# Patient Record
Sex: FEMALE | Race: WHITE | NOT HISPANIC OR LATINO | URBAN - METROPOLITAN AREA
[De-identification: names, ages, dates, MRNs, and addresses within clinical notes are randomized per-mention and may not be internally consistent; named-entity substitution may affect disease eponyms.]

---

## 2024-03-11 ENCOUNTER — APPOINTMENT (OUTPATIENT)
Dept: RADIOLOGY | Facility: HOSPITAL | Age: 59
End: 2024-03-11
Payer: MEDICAID

## 2024-03-11 ENCOUNTER — HOSPITAL ENCOUNTER (INPATIENT)
Facility: HOSPITAL | Age: 59
LOS: 16 days | Discharge: HOME | End: 2024-03-28
Attending: EMERGENCY MEDICINE | Admitting: SURGERY
Payer: MEDICAID

## 2024-03-11 ENCOUNTER — HOSPITAL ENCOUNTER (EMERGENCY)
Facility: HOSPITAL | Age: 59
Discharge: OTHER INSTITUTION | End: 2024-03-11
Attending: EMERGENCY MEDICINE
Payer: MEDICAID

## 2024-03-11 VITALS
RESPIRATION RATE: 20 BRPM | DIASTOLIC BLOOD PRESSURE: 82 MMHG | BODY MASS INDEX: 46.83 KG/M2 | HEART RATE: 101 BPM | TEMPERATURE: 98.2 F | WEIGHT: 281.09 LBS | OXYGEN SATURATION: 98 % | HEIGHT: 65 IN | SYSTOLIC BLOOD PRESSURE: 140 MMHG

## 2024-03-11 DIAGNOSIS — A48.0 GAS GANGRENE (MULTI): ICD-10-CM

## 2024-03-11 DIAGNOSIS — M79.89 NECROTIZING SOFT TISSUE INFECTION: Primary | ICD-10-CM

## 2024-03-11 DIAGNOSIS — N49.3 FOURNIER'S GANGRENE (MULTI): Primary | ICD-10-CM

## 2024-03-11 LAB
ALBUMIN SERPL-MCNC: 3.4 G/DL (ref 3.5–5)
ALP BLD-CCNC: 133 U/L (ref 35–125)
ALT SERPL-CCNC: 17 U/L (ref 5–40)
ANION GAP SERPL CALC-SCNC: 12 MMOL/L
AST SERPL-CCNC: 16 U/L (ref 5–40)
BASOPHILS # BLD AUTO: 0.04 X10*3/UL (ref 0–0.1)
BASOPHILS NFR BLD AUTO: 0.3 %
BILIRUB SERPL-MCNC: 0.4 MG/DL (ref 0.1–1.2)
BUN SERPL-MCNC: 13 MG/DL (ref 8–25)
CALCIUM SERPL-MCNC: 9.4 MG/DL (ref 8.5–10.4)
CHLORIDE SERPL-SCNC: 101 MMOL/L (ref 97–107)
CO2 SERPL-SCNC: 24 MMOL/L (ref 24–31)
CREAT SERPL-MCNC: 0.9 MG/DL (ref 0.4–1.6)
EGFRCR SERPLBLD CKD-EPI 2021: 74 ML/MIN/1.73M*2
EOSINOPHIL # BLD AUTO: 0.09 X10*3/UL (ref 0–0.7)
EOSINOPHIL NFR BLD AUTO: 0.7 %
ERYTHROCYTE [DISTWIDTH] IN BLOOD BY AUTOMATED COUNT: 14.2 % (ref 11.5–14.5)
GLUCOSE SERPL-MCNC: 117 MG/DL (ref 65–99)
HCT VFR BLD AUTO: 43.4 % (ref 36–46)
HGB BLD-MCNC: 14.6 G/DL (ref 12–16)
IMM GRANULOCYTES # BLD AUTO: 0.04 X10*3/UL (ref 0–0.7)
IMM GRANULOCYTES NFR BLD AUTO: 0.3 % (ref 0–0.9)
LACTATE BLDV-SCNC: 1.2 MMOL/L (ref 0.4–2)
LYMPHOCYTES # BLD AUTO: 2.02 X10*3/UL (ref 1.2–4.8)
LYMPHOCYTES NFR BLD AUTO: 16.4 %
MCH RBC QN AUTO: 31.9 PG (ref 26–34)
MCHC RBC AUTO-ENTMCNC: 33.6 G/DL (ref 32–36)
MCV RBC AUTO: 95 FL (ref 80–100)
MONOCYTES # BLD AUTO: 1.04 X10*3/UL (ref 0.1–1)
MONOCYTES NFR BLD AUTO: 8.5 %
NEUTROPHILS # BLD AUTO: 9.05 X10*3/UL (ref 1.2–7.7)
NEUTROPHILS NFR BLD AUTO: 73.8 %
NRBC BLD-RTO: 0 /100 WBCS (ref 0–0)
PLATELET # BLD AUTO: 303 X10*3/UL (ref 150–450)
POTASSIUM SERPL-SCNC: 4.7 MMOL/L (ref 3.4–5.1)
PROT SERPL-MCNC: 8 G/DL (ref 5.9–7.9)
RBC # BLD AUTO: 4.57 X10*6/UL (ref 4–5.2)
SODIUM SERPL-SCNC: 137 MMOL/L (ref 133–145)
WBC # BLD AUTO: 12.3 X10*3/UL (ref 4.4–11.3)

## 2024-03-11 PROCEDURE — 2500000004 HC RX 250 GENERAL PHARMACY W/ HCPCS (ALT 636 FOR OP/ED): Mod: JZ

## 2024-03-11 PROCEDURE — 83605 ASSAY OF LACTIC ACID: CPT | Performed by: PHYSICIAN ASSISTANT

## 2024-03-11 PROCEDURE — 99291 CRITICAL CARE FIRST HOUR: CPT | Performed by: EMERGENCY MEDICINE

## 2024-03-11 PROCEDURE — 96361 HYDRATE IV INFUSION ADD-ON: CPT

## 2024-03-11 PROCEDURE — 96365 THER/PROPH/DIAG IV INF INIT: CPT | Mod: 59

## 2024-03-11 PROCEDURE — 74177 CT ABD & PELVIS W/CONTRAST: CPT

## 2024-03-11 PROCEDURE — 2500000004 HC RX 250 GENERAL PHARMACY W/ HCPCS (ALT 636 FOR OP/ED): Performed by: PHYSICIAN ASSISTANT

## 2024-03-11 PROCEDURE — 36415 COLL VENOUS BLD VENIPUNCTURE: CPT | Performed by: PHYSICIAN ASSISTANT

## 2024-03-11 PROCEDURE — 2550000001 HC RX 255 CONTRASTS: Performed by: EMERGENCY MEDICINE

## 2024-03-11 PROCEDURE — 96375 TX/PRO/DX INJ NEW DRUG ADDON: CPT

## 2024-03-11 PROCEDURE — 96374 THER/PROPH/DIAG INJ IV PUSH: CPT

## 2024-03-11 PROCEDURE — 80053 COMPREHEN METABOLIC PANEL: CPT | Performed by: PHYSICIAN ASSISTANT

## 2024-03-11 PROCEDURE — 74177 CT ABD & PELVIS W/CONTRAST: CPT | Performed by: RADIOLOGY

## 2024-03-11 PROCEDURE — 99285 EMERGENCY DEPT VISIT HI MDM: CPT | Mod: 25

## 2024-03-11 PROCEDURE — 96376 TX/PRO/DX INJ SAME DRUG ADON: CPT

## 2024-03-11 PROCEDURE — 85025 COMPLETE CBC W/AUTO DIFF WBC: CPT | Performed by: PHYSICIAN ASSISTANT

## 2024-03-11 PROCEDURE — 87040 BLOOD CULTURE FOR BACTERIA: CPT | Mod: 59,TRILAB | Performed by: PHYSICIAN ASSISTANT

## 2024-03-11 RX ORDER — VANCOMYCIN 2 G/400ML
2000 INJECTION, SOLUTION INTRAVENOUS ONCE
Status: DISCONTINUED | OUTPATIENT
Start: 2024-03-11 | End: 2024-03-11 | Stop reason: HOSPADM

## 2024-03-11 RX ORDER — MORPHINE SULFATE 4 MG/ML
4 INJECTION, SOLUTION INTRAMUSCULAR; INTRAVENOUS ONCE
Status: COMPLETED | OUTPATIENT
Start: 2024-03-11 | End: 2024-03-11

## 2024-03-11 RX ORDER — ONDANSETRON HYDROCHLORIDE 2 MG/ML
4 INJECTION, SOLUTION INTRAVENOUS ONCE
Status: COMPLETED | OUTPATIENT
Start: 2024-03-11 | End: 2024-03-11

## 2024-03-11 RX ORDER — VANCOMYCIN HYDROCHLORIDE 1 G/20ML
INJECTION, POWDER, LYOPHILIZED, FOR SOLUTION INTRAVENOUS DAILY PRN
Status: DISCONTINUED | OUTPATIENT
Start: 2024-03-11 | End: 2024-03-11

## 2024-03-11 RX ORDER — HYDROMORPHONE HYDROCHLORIDE 1 MG/ML
1 INJECTION, SOLUTION INTRAMUSCULAR; INTRAVENOUS; SUBCUTANEOUS ONCE
Status: COMPLETED | OUTPATIENT
Start: 2024-03-12 | End: 2024-03-12

## 2024-03-11 RX ADMIN — MORPHINE SULFATE 4 MG: 4 INJECTION, SOLUTION INTRAMUSCULAR; INTRAVENOUS at 22:03

## 2024-03-11 RX ADMIN — CEFEPIME 1 G: 1 INJECTION, POWDER, FOR SOLUTION INTRAMUSCULAR; INTRAVENOUS at 20:15

## 2024-03-11 RX ADMIN — MORPHINE SULFATE 4 MG: 4 INJECTION, SOLUTION INTRAMUSCULAR; INTRAVENOUS at 20:25

## 2024-03-11 RX ADMIN — VANCOMYCIN 2000 MG: 2 INJECTION, SOLUTION INTRAVENOUS at 21:33

## 2024-03-11 RX ADMIN — IOHEXOL 75 ML: 350 INJECTION, SOLUTION INTRAVENOUS at 20:20

## 2024-03-11 RX ADMIN — ONDANSETRON 4 MG: 2 INJECTION INTRAMUSCULAR; INTRAVENOUS at 20:25

## 2024-03-11 RX ADMIN — SODIUM CHLORIDE 1000 ML: 9 INJECTION, SOLUTION INTRAVENOUS at 19:15

## 2024-03-11 ASSESSMENT — PAIN SCALES - GENERAL: PAINLEVEL_OUTOF10: 6

## 2024-03-11 ASSESSMENT — COLUMBIA-SUICIDE SEVERITY RATING SCALE - C-SSRS
1. IN THE PAST MONTH, HAVE YOU WISHED YOU WERE DEAD OR WISHED YOU COULD GO TO SLEEP AND NOT WAKE UP?: NO
6. HAVE YOU EVER DONE ANYTHING, STARTED TO DO ANYTHING, OR PREPARED TO DO ANYTHING TO END YOUR LIFE?: NO
2. HAVE YOU ACTUALLY HAD ANY THOUGHTS OF KILLING YOURSELF?: NO
1. IN THE PAST MONTH, HAVE YOU WISHED YOU WERE DEAD OR WISHED YOU COULD GO TO SLEEP AND NOT WAKE UP?: NO
2. HAVE YOU ACTUALLY HAD ANY THOUGHTS OF KILLING YOURSELF?: NO
6. HAVE YOU EVER DONE ANYTHING, STARTED TO DO ANYTHING, OR PREPARED TO DO ANYTHING TO END YOUR LIFE?: NO

## 2024-03-11 ASSESSMENT — PAIN DESCRIPTION - ONSET: ONSET: SUDDEN

## 2024-03-11 ASSESSMENT — PAIN - FUNCTIONAL ASSESSMENT
PAIN_FUNCTIONAL_ASSESSMENT: 0-10
PAIN_FUNCTIONAL_ASSESSMENT: 0-10

## 2024-03-11 ASSESSMENT — PAIN DESCRIPTION - PAIN TYPE: TYPE: ACUTE PAIN

## 2024-03-11 ASSESSMENT — PAIN DESCRIPTION - ORIENTATION: ORIENTATION: RIGHT

## 2024-03-11 ASSESSMENT — PAIN DESCRIPTION - PROGRESSION: CLINICAL_PROGRESSION: NOT CHANGED

## 2024-03-11 ASSESSMENT — PAIN DESCRIPTION - LOCATION: LOCATION: GROIN

## 2024-03-11 ASSESSMENT — PAIN DESCRIPTION - FREQUENCY: FREQUENCY: CONSTANT/CONTINUOUS

## 2024-03-11 ASSESSMENT — PAIN DESCRIPTION - DESCRIPTORS: DESCRIPTORS: BURNING;SHARP

## 2024-03-11 NOTE — LETTER
March 28, 2024     Patient: Susan Farfan   YOB: 1965   Date of Visit: 3/11/2024       To Whom It May Concern:    It is my medical opinion that Susan Farfan may return to work on 4/1/2024 .    If you have any questions or concerns, please don't hesitate to call.         Sincerely,        Jasmin Aguilar PA-C      CC: No Recipients

## 2024-03-11 NOTE — ED PROVIDER NOTES
HPI   Chief Complaint   Patient presents with   • Abscess     A week ago Thursday I had a boil and it popped and it got worse it feel stinging burning pain  it is odiferious       58-year-old female presented emergency department with a chief complaint of an infection in her groin.  States has been there for over a week.  He states that is draining and foul-smelling.  She saw her primary doctor was placed on a weeklong course of clindamycin without improvement.  She is a diabetic.  She denies fevers.  She is tachycardic on arrival.  She states that the wound has been continuously draining.  Denies injury or trauma.  Denies abdominal pain or discomfort.  Denies vomiting.                          Oscar Coma Scale Score: 15                     Patient History   No past medical history on file.  No past surgical history on file.  No family history on file.  Social History     Tobacco Use   • Smoking status: Not on file   • Smokeless tobacco: Not on file   Substance Use Topics   • Alcohol use: Not on file   • Drug use: Not on file       Physical Exam   ED Triage Vitals [03/11/24 1752]   Temperature Heart Rate Respirations BP   36.3 °C (97.3 °F) (!) 110 18 (!) 144/92      Pulse Ox Temp Source Heart Rate Source Patient Position   98 % Oral Monitor Sitting      BP Location FiO2 (%)     Left arm --       Physical Exam  Vitals and nursing note reviewed.   Constitutional:       Appearance: Normal appearance.   HENT:      Head: Normocephalic and atraumatic.      Nose: Nose normal.      Mouth/Throat:      Mouth: Mucous membranes are moist.   Cardiovascular:      Rate and Rhythm: Normal rate and regular rhythm.   Pulmonary:      Effort: Pulmonary effort is normal.      Breath sounds: Normal breath sounds.   Abdominal:      General: Abdomen is flat.   Genitourinary:     Comments: In the right perineal area there was a very large draining fluctuant abscess with evidence of necrotic tissue concerning for Gopi's  gangrene  Musculoskeletal:         General: Normal range of motion.      Cervical back: Normal range of motion.   Skin:     General: Skin is warm.   Neurological:      General: No focal deficit present.      Mental Status: She is alert and oriented to person, place, and time.   Psychiatric:         Mood and Affect: Mood normal.         Behavior: Behavior normal.         ED Course & MDM   Diagnoses as of 03/11/24 2136   Gopi's gangrene       Medical Decision Making  I have seen and evaluated this patient.  The attending physician has also seen and evaluated this patient.  Vital signs, laboratory testing and diagnostic images if applicable have been reviewed.  All laboratory and imaging is interpreted by myself unless otherwise stated.  Radiology studies are also formally interpreted by radiologist.    CBC with 12,000 leukocytosis, no significant anemia metabolic panel without hazel renal impairment electrolyte abnormality.  Lactic acid is within normal limits.  Clinical exam was concerning for Gopi's gangrene.  CT scan was ordered.  There is gas in the subcutaneous tissue.  Surgeon consulted.  No surgeon at our facility will have to transfer to [facility].  Dr. Buchanan was consulted.  Ultimately sent ED to ED.  Dr. Alvarez is emergency department accepting.  Patient received vancomycin and cefepime.  Blood cultures were sent.  Overall patient is nontoxic-appearing.  Admitted for further treatment and management.    I have seen and evaluated this patient and independently provided 31 minutes of nonconcurrent critical care time. This does not include separately billable procedures. Patient with high potential for deterioration, required frequent monitoring and assessment.      Labs Reviewed   CBC WITH AUTO DIFFERENTIAL - Abnormal       Result Value    WBC 12.3 (*)     nRBC 0.0      RBC 4.57      Hemoglobin 14.6      Hematocrit 43.4      MCV 95      MCH 31.9      MCHC 33.6      RDW 14.2      Platelets 303       Neutrophils % 73.8      Immature Granulocytes %, Automated 0.3      Lymphocytes % 16.4      Monocytes % 8.5      Eosinophils % 0.7      Basophils % 0.3      Neutrophils Absolute 9.05 (*)     Immature Granulocytes Absolute, Automated 0.04      Lymphocytes Absolute 2.02      Monocytes Absolute 1.04 (*)     Eosinophils Absolute 0.09      Basophils Absolute 0.04     COMPREHENSIVE METABOLIC PANEL - Abnormal    Glucose 117 (*)     Sodium 137      Potassium 4.7      Chloride 101      Bicarbonate 24      Urea Nitrogen 13      Creatinine 0.90      eGFR 74      Calcium 9.4      Albumin 3.4 (*)     Alkaline Phosphatase 133 (*)     Total Protein 8.0 (*)     AST 16      Bilirubin, Total 0.4      ALT 17      Anion Gap 12     BLOOD GAS LACTIC ACID, VENOUS - Normal    POCT Lactate, Venous 1.2     BLOOD CULTURE   BLOOD CULTURE     CT abdomen pelvis w IV contrast   Final Result   1. Signs of gas gangrene in the medial right upper thigh extending   into the right groin with significant soft tissue gas in this area.   No fluid collection. Surgical consultation is needed.   2. Ordering healthcare provider was notified of the findings by   telephone at 8:41 p.m. on 03/11/2024             Signed by: Donita Castro 3/11/2024 8:44 PM   Dictation workstation:   [id number]        Medications   vancomycin-diluent combo no.1 (Xellia) IVPB 2,000 mg (2,000 mg intravenous New Bag 3/11/24 2133)   cefepime (Maxipime) 1 g in dextrose 5 % 50 mL IV (0 g intravenous Stopped 3/11/24 2133)   sodium chloride 0.9 % bolus 1,000 mL (0 mL intravenous Stopped 3/11/24 2133)   iohexol (OMNIPaque) 350 mg iodine/mL solution 75 mL (75 mL intravenous Given 3/11/24 2020)   morphine injection 4 mg (4 mg intravenous Given 3/11/24 2025)   ondansetron (Zofran) injection 4 mg (4 mg intravenous Given 3/11/24 2025)     New Prescriptions    No medications on file         Procedure  Procedures     Salas Porras PA-C  03/11/24 2136

## 2024-03-11 NOTE — LETTER
March 28, 2024     Patient: Susan Farfan   YOB: 1965   Date of Visit: 3/11/2024       To Whom It May Concern:    It is my medical opinion that Susan Farfan may return to work on 4/1/2024 .    If you have any questions or concerns, please don't hesitate to call.         Sincerely,        No name on file    CC: No Recipients

## 2024-03-11 NOTE — CONSULTS
Vancomycin Dosing by Pharmacy- EMERGENCY DEPARTMENT    Susan Farfan is a 58 y.o. year old female who Pharmacy has been consulted to give a ONE TIME ONLY vancomycin dose in the Emergency Department for pneumonia.     Visit Vitals  BP (!) 144/92 (BP Location: Left arm, Patient Position: Sitting)   Pulse (!) 110   Temp 36.3 °C (97.3 °F) (Oral)   Resp 18        Lab Results   Component Value Date    CREATININE 0.78 08/04/2022        Patient weight is   Wt Readings from Last 1 Encounters:   03/11/24 127 kg (281 lb 1.4 oz)        Assessment/Plan     Patient will be given a one time dose of 2000 mg  Pharmacy will sign off at this time. If vancomycin is to be continued, please re-consult Pharmacy.       LEVY AVALOS, PharmD

## 2024-03-12 ENCOUNTER — ANESTHESIA (OUTPATIENT)
Dept: OPERATING ROOM | Facility: HOSPITAL | Age: 59
End: 2024-03-12
Payer: MEDICAID

## 2024-03-12 ENCOUNTER — APPOINTMENT (OUTPATIENT)
Dept: RADIOLOGY | Facility: HOSPITAL | Age: 59
End: 2024-03-12
Payer: MEDICAID

## 2024-03-12 ENCOUNTER — ANESTHESIA EVENT (OUTPATIENT)
Dept: OPERATING ROOM | Facility: HOSPITAL | Age: 59
End: 2024-03-12
Payer: MEDICAID

## 2024-03-12 PROBLEM — I10 HTN (HYPERTENSION): Status: ACTIVE | Noted: 2024-03-12

## 2024-03-12 PROBLEM — M54.2 CHRONIC NECK PAIN: Status: ACTIVE | Noted: 2024-03-12

## 2024-03-12 PROBLEM — G89.29 CHRONIC LOW BACK PAIN: Status: ACTIVE | Noted: 2024-03-12

## 2024-03-12 PROBLEM — E78.5 HYPERLIPIDEMIA: Status: ACTIVE | Noted: 2024-03-12

## 2024-03-12 PROBLEM — M54.50 CHRONIC LOW BACK PAIN: Status: ACTIVE | Noted: 2024-03-12

## 2024-03-12 PROBLEM — M79.89 NECROTIZING SOFT TISSUE INFECTION: Status: ACTIVE | Noted: 2024-03-11

## 2024-03-12 PROBLEM — E66.9 OBESITY: Status: ACTIVE | Noted: 2024-03-12

## 2024-03-12 PROBLEM — G89.29 CHRONIC NECK PAIN: Status: ACTIVE | Noted: 2024-03-12

## 2024-03-12 PROBLEM — G89.4 CHRONIC PAIN SYNDROME: Status: ACTIVE | Noted: 2024-03-12

## 2024-03-12 PROBLEM — M48.00 SPINAL STENOSIS: Status: ACTIVE | Noted: 2024-03-12

## 2024-03-12 LAB
APTT PPP: 23 SECONDS (ref 27–38)
BASOPHILS # BLD AUTO: 0.04 X10*3/UL (ref 0–0.1)
BASOPHILS NFR BLD AUTO: 0.3 %
CA-I BLD-SCNC: 0.99 MMOL/L (ref 1.1–1.33)
EOSINOPHIL # BLD AUTO: 0.01 X10*3/UL (ref 0–0.7)
EOSINOPHIL NFR BLD AUTO: 0.1 %
ERYTHROCYTE [DISTWIDTH] IN BLOOD BY AUTOMATED COUNT: 14.6 % (ref 11.5–14.5)
EST. AVERAGE GLUCOSE BLD GHB EST-MCNC: 154 MG/DL
GLUCOSE BLD MANUAL STRIP-MCNC: 124 MG/DL (ref 74–99)
GLUCOSE BLD MANUAL STRIP-MCNC: 145 MG/DL (ref 74–99)
GLUCOSE BLD MANUAL STRIP-MCNC: 160 MG/DL (ref 74–99)
GLUCOSE BLD MANUAL STRIP-MCNC: 172 MG/DL (ref 74–99)
GLUCOSE BLD MANUAL STRIP-MCNC: 176 MG/DL (ref 74–99)
HBA1C MFR BLD: 7 %
HCT VFR BLD AUTO: 42.8 % (ref 36–46)
HGB BLD-MCNC: 13.3 G/DL (ref 12–16)
IMM GRANULOCYTES # BLD AUTO: 0.07 X10*3/UL (ref 0–0.7)
IMM GRANULOCYTES NFR BLD AUTO: 0.5 % (ref 0–0.9)
INR PPP: 1.1 (ref 0.9–1.1)
LYMPHOCYTES # BLD AUTO: 0.69 X10*3/UL (ref 1.2–4.8)
LYMPHOCYTES NFR BLD AUTO: 5.3 %
MCH RBC QN AUTO: 31.1 PG (ref 26–34)
MCHC RBC AUTO-ENTMCNC: 31.1 G/DL (ref 32–36)
MCV RBC AUTO: 100 FL (ref 80–100)
MONOCYTES # BLD AUTO: 0.18 X10*3/UL (ref 0.1–1)
MONOCYTES NFR BLD AUTO: 1.4 %
NEUTROPHILS # BLD AUTO: 12.09 X10*3/UL (ref 1.2–7.7)
NEUTROPHILS NFR BLD AUTO: 92.4 %
NRBC BLD-RTO: 0 /100 WBCS (ref 0–0)
PLATELET # BLD AUTO: 277 X10*3/UL (ref 150–450)
PROTHROMBIN TIME: 11.9 SECONDS (ref 9.8–12.8)
RBC # BLD AUTO: 4.28 X10*6/UL (ref 4–5.2)
WBC # BLD AUTO: 13.1 X10*3/UL (ref 4.4–11.3)

## 2024-03-12 PROCEDURE — 82947 ASSAY GLUCOSE BLOOD QUANT: CPT

## 2024-03-12 PROCEDURE — 99223 1ST HOSP IP/OBS HIGH 75: CPT | Performed by: SURGERY

## 2024-03-12 PROCEDURE — 2500000004 HC RX 250 GENERAL PHARMACY W/ HCPCS (ALT 636 FOR OP/ED): Mod: SE

## 2024-03-12 PROCEDURE — 3600000003 HC OR TIME - INITIAL BASE CHARGE - PROCEDURE LEVEL THREE: Performed by: SURGERY

## 2024-03-12 PROCEDURE — 3700000002 HC GENERAL ANESTHESIA TIME - EACH INCREMENTAL 1 MINUTE: Performed by: SURGERY

## 2024-03-12 PROCEDURE — 11006 DBRDMT SKIN XTRNL GENT PER: CPT | Performed by: SURGERY

## 2024-03-12 PROCEDURE — 2500000004 HC RX 250 GENERAL PHARMACY W/ HCPCS (ALT 636 FOR OP/ED): Performed by: STUDENT IN AN ORGANIZED HEALTH CARE EDUCATION/TRAINING PROGRAM

## 2024-03-12 PROCEDURE — 7100000001 HC RECOVERY ROOM TIME - INITIAL BASE CHARGE: Performed by: SURGERY

## 2024-03-12 PROCEDURE — 3600000008 HC OR TIME - EACH INCREMENTAL 1 MINUTE - PROCEDURE LEVEL THREE: Performed by: SURGERY

## 2024-03-12 PROCEDURE — 2500000005 HC RX 250 GENERAL PHARMACY W/O HCPCS: Mod: SE

## 2024-03-12 PROCEDURE — 36415 COLL VENOUS BLD VENIPUNCTURE: CPT | Performed by: STUDENT IN AN ORGANIZED HEALTH CARE EDUCATION/TRAINING PROGRAM

## 2024-03-12 PROCEDURE — 2500000001 HC RX 250 WO HCPCS SELF ADMINISTERED DRUGS (ALT 637 FOR MEDICARE OP)

## 2024-03-12 PROCEDURE — 2500000004 HC RX 250 GENERAL PHARMACY W/ HCPCS (ALT 636 FOR OP/ED): Performed by: NURSE PRACTITIONER

## 2024-03-12 PROCEDURE — 2500000004 HC RX 250 GENERAL PHARMACY W/ HCPCS (ALT 636 FOR OP/ED)

## 2024-03-12 PROCEDURE — 7100000002 HC RECOVERY ROOM TIME - EACH INCREMENTAL 1 MINUTE: Performed by: SURGERY

## 2024-03-12 PROCEDURE — 2720000007 HC OR 272 NO HCPCS: Performed by: SURGERY

## 2024-03-12 PROCEDURE — 2500000004 HC RX 250 GENERAL PHARMACY W/ HCPCS (ALT 636 FOR OP/ED): Mod: JZ,SE

## 2024-03-12 PROCEDURE — 83036 HEMOGLOBIN GLYCOSYLATED A1C: CPT

## 2024-03-12 PROCEDURE — 85025 COMPLETE CBC W/AUTO DIFF WBC: CPT

## 2024-03-12 PROCEDURE — A11042 PR DEBRIDEMENT, SKIN, SUB-Q TISSUE,=<20 SQ CM: Performed by: STUDENT IN AN ORGANIZED HEALTH CARE EDUCATION/TRAINING PROGRAM

## 2024-03-12 PROCEDURE — 82330 ASSAY OF CALCIUM: CPT | Performed by: STUDENT IN AN ORGANIZED HEALTH CARE EDUCATION/TRAINING PROGRAM

## 2024-03-12 PROCEDURE — 2500000002 HC RX 250 W HCPCS SELF ADMINISTERED DRUGS (ALT 637 FOR MEDICARE OP, ALT 636 FOR OP/ED)

## 2024-03-12 PROCEDURE — 99233 SBSQ HOSP IP/OBS HIGH 50: CPT

## 2024-03-12 PROCEDURE — 1200000002 HC GENERAL ROOM WITH TELEMETRY DAILY

## 2024-03-12 PROCEDURE — 0JBB0ZZ EXCISION OF PERINEUM SUBCUTANEOUS TISSUE AND FASCIA, OPEN APPROACH: ICD-10-PCS | Performed by: SURGERY

## 2024-03-12 PROCEDURE — 3700000001 HC GENERAL ANESTHESIA TIME - INITIAL BASE CHARGE: Performed by: SURGERY

## 2024-03-12 PROCEDURE — 71045 X-RAY EXAM CHEST 1 VIEW: CPT

## 2024-03-12 PROCEDURE — 85610 PROTHROMBIN TIME: CPT

## 2024-03-12 PROCEDURE — 87185 SC STD ENZYME DETCJ PER NZM: CPT | Performed by: EMERGENCY MEDICINE

## 2024-03-12 RX ORDER — HYDROMORPHONE HYDROCHLORIDE 1 MG/ML
0.4 INJECTION, SOLUTION INTRAMUSCULAR; INTRAVENOUS; SUBCUTANEOUS EVERY 4 HOURS PRN
Status: DISCONTINUED | OUTPATIENT
Start: 2024-03-12 | End: 2024-03-21

## 2024-03-12 RX ORDER — OXYCODONE HYDROCHLORIDE 5 MG/1
5 TABLET ORAL EVERY 4 HOURS PRN
Status: DISCONTINUED | OUTPATIENT
Start: 2024-03-12 | End: 2024-03-21

## 2024-03-12 RX ORDER — HYDROMORPHONE HYDROCHLORIDE 1 MG/ML
0.5 INJECTION, SOLUTION INTRAMUSCULAR; INTRAVENOUS; SUBCUTANEOUS EVERY 5 MIN PRN
Status: DISCONTINUED | OUTPATIENT
Start: 2024-03-12 | End: 2024-03-12 | Stop reason: HOSPADM

## 2024-03-12 RX ORDER — POLYETHYLENE GLYCOL 3350 17 G/17G
17 POWDER, FOR SOLUTION ORAL DAILY
Status: DISCONTINUED | OUTPATIENT
Start: 2024-03-12 | End: 2024-03-28 | Stop reason: HOSPADM

## 2024-03-12 RX ORDER — ACETAMINOPHEN 325 MG/1
975 TABLET ORAL EVERY 8 HOURS
Status: DISCONTINUED | OUTPATIENT
Start: 2024-03-12 | End: 2024-03-28 | Stop reason: HOSPADM

## 2024-03-12 RX ORDER — PHENYLEPHRINE HCL IN 0.9% NACL 0.4MG/10ML
SYRINGE (ML) INTRAVENOUS AS NEEDED
Status: DISCONTINUED | OUTPATIENT
Start: 2024-03-12 | End: 2024-03-12

## 2024-03-12 RX ORDER — LIDOCAINE HYDROCHLORIDE 10 MG/ML
0.1 INJECTION INFILTRATION; PERINEURAL ONCE
Status: DISCONTINUED | OUTPATIENT
Start: 2024-03-12 | End: 2024-03-12 | Stop reason: HOSPADM

## 2024-03-12 RX ORDER — CLINDAMYCIN PHOSPHATE 600 MG/50ML
600 INJECTION, SOLUTION INTRAVENOUS ONCE
Status: COMPLETED | OUTPATIENT
Start: 2024-03-12 | End: 2024-03-12

## 2024-03-12 RX ORDER — NALOXONE HYDROCHLORIDE 0.4 MG/ML
0.2 INJECTION, SOLUTION INTRAMUSCULAR; INTRAVENOUS; SUBCUTANEOUS EVERY 5 MIN PRN
Status: DISCONTINUED | OUTPATIENT
Start: 2024-03-12 | End: 2024-03-28 | Stop reason: HOSPADM

## 2024-03-12 RX ORDER — KETAMINE HYDROCHLORIDE 100 MG/ML
80 INJECTION, SOLUTION INTRAMUSCULAR; INTRAVENOUS ONCE
Status: COMPLETED | OUTPATIENT
Start: 2024-03-12 | End: 2024-03-12

## 2024-03-12 RX ORDER — AMOXICILLIN 250 MG
2 CAPSULE ORAL 2 TIMES DAILY
Status: DISCONTINUED | OUTPATIENT
Start: 2024-03-12 | End: 2024-03-28 | Stop reason: HOSPADM

## 2024-03-12 RX ORDER — ALBUTEROL SULFATE 0.83 MG/ML
2.5 SOLUTION RESPIRATORY (INHALATION) ONCE AS NEEDED
Status: DISCONTINUED | OUTPATIENT
Start: 2024-03-12 | End: 2024-03-12 | Stop reason: HOSPADM

## 2024-03-12 RX ORDER — CLINDAMYCIN PHOSPHATE 600 MG/50ML
600 INJECTION, SOLUTION INTRAVENOUS EVERY 8 HOURS
Status: COMPLETED | OUTPATIENT
Start: 2024-03-12 | End: 2024-03-15

## 2024-03-12 RX ORDER — FENTANYL CITRATE 50 UG/ML
INJECTION, SOLUTION INTRAMUSCULAR; INTRAVENOUS
Status: DISPENSED
Start: 2024-03-12 | End: 2024-03-12

## 2024-03-12 RX ORDER — MIDAZOLAM HYDROCHLORIDE 1 MG/ML
0.5 INJECTION INTRAMUSCULAR; INTRAVENOUS AS NEEDED
Status: DISCONTINUED | OUTPATIENT
Start: 2024-03-12 | End: 2024-03-13

## 2024-03-12 RX ORDER — LABETALOL HYDROCHLORIDE 5 MG/ML
5 INJECTION, SOLUTION INTRAVENOUS ONCE AS NEEDED
Status: DISCONTINUED | OUTPATIENT
Start: 2024-03-12 | End: 2024-03-12 | Stop reason: HOSPADM

## 2024-03-12 RX ORDER — MIDAZOLAM HYDROCHLORIDE 1 MG/ML
INJECTION, SOLUTION INTRAMUSCULAR; INTRAVENOUS AS NEEDED
Status: DISCONTINUED | OUTPATIENT
Start: 2024-03-12 | End: 2024-03-12

## 2024-03-12 RX ORDER — FENTANYL CITRATE 50 UG/ML
50 INJECTION, SOLUTION INTRAMUSCULAR; INTRAVENOUS EVERY 5 MIN PRN
Status: DISCONTINUED | OUTPATIENT
Start: 2024-03-12 | End: 2024-03-13

## 2024-03-12 RX ORDER — INSULIN LISPRO 100 [IU]/ML
0-10 INJECTION, SOLUTION INTRAVENOUS; SUBCUTANEOUS EVERY 4 HOURS
Status: DISCONTINUED | OUTPATIENT
Start: 2024-03-12 | End: 2024-03-15

## 2024-03-12 RX ORDER — ONDANSETRON HYDROCHLORIDE 2 MG/ML
INJECTION, SOLUTION INTRAVENOUS
Status: COMPLETED
Start: 2024-03-12 | End: 2024-03-12

## 2024-03-12 RX ORDER — VANCOMYCIN HYDROCHLORIDE 1 G/20ML
INJECTION, POWDER, LYOPHILIZED, FOR SOLUTION INTRAVENOUS DAILY PRN
Status: DISCONTINUED | OUTPATIENT
Start: 2024-03-12 | End: 2024-03-15

## 2024-03-12 RX ORDER — ACETAMINOPHEN 325 MG/1
975 TABLET ORAL EVERY 6 HOURS SCHEDULED
Status: DISCONTINUED | OUTPATIENT
Start: 2024-03-12 | End: 2024-03-12

## 2024-03-12 RX ORDER — HYDROMORPHONE HYDROCHLORIDE 1 MG/ML
0.1 INJECTION, SOLUTION INTRAMUSCULAR; INTRAVENOUS; SUBCUTANEOUS EVERY 5 MIN PRN
Status: DISCONTINUED | OUTPATIENT
Start: 2024-03-12 | End: 2024-03-12 | Stop reason: HOSPADM

## 2024-03-12 RX ORDER — KETAMINE HYDROCHLORIDE 100 MG/ML
50 INJECTION, SOLUTION INTRAMUSCULAR; INTRAVENOUS ONCE
Status: DISCONTINUED | OUTPATIENT
Start: 2024-03-12 | End: 2024-03-12

## 2024-03-12 RX ORDER — FENTANYL CITRATE 50 UG/ML
INJECTION, SOLUTION INTRAMUSCULAR; INTRAVENOUS AS NEEDED
Status: DISCONTINUED | OUTPATIENT
Start: 2024-03-12 | End: 2024-03-12

## 2024-03-12 RX ORDER — ONDANSETRON HYDROCHLORIDE 2 MG/ML
INJECTION, SOLUTION INTRAVENOUS AS NEEDED
Status: DISCONTINUED | OUTPATIENT
Start: 2024-03-12 | End: 2024-03-12

## 2024-03-12 RX ORDER — SODIUM CHLORIDE, SODIUM LACTATE, POTASSIUM CHLORIDE, CALCIUM CHLORIDE 600; 310; 30; 20 MG/100ML; MG/100ML; MG/100ML; MG/100ML
INJECTION, SOLUTION INTRAVENOUS CONTINUOUS PRN
Status: DISCONTINUED | OUTPATIENT
Start: 2024-03-12 | End: 2024-03-12

## 2024-03-12 RX ORDER — OXYCODONE HYDROCHLORIDE 5 MG/1
10 TABLET ORAL EVERY 4 HOURS PRN
Status: DISCONTINUED | OUTPATIENT
Start: 2024-03-12 | End: 2024-03-17

## 2024-03-12 RX ORDER — DEXTROSE 50 % IN WATER (D50W) INTRAVENOUS SYRINGE
25
Status: DISCONTINUED | OUTPATIENT
Start: 2024-03-12 | End: 2024-03-28 | Stop reason: HOSPADM

## 2024-03-12 RX ORDER — ONDANSETRON HYDROCHLORIDE 2 MG/ML
4 INJECTION, SOLUTION INTRAVENOUS ONCE
Status: COMPLETED | OUTPATIENT
Start: 2024-03-12 | End: 2024-03-12

## 2024-03-12 RX ORDER — SUCCINYLCHOLINE CHLORIDE 20 MG/ML
INJECTION INTRAMUSCULAR; INTRAVENOUS AS NEEDED
Status: DISCONTINUED | OUTPATIENT
Start: 2024-03-12 | End: 2024-03-12

## 2024-03-12 RX ORDER — SODIUM CHLORIDE, SODIUM LACTATE, POTASSIUM CHLORIDE, CALCIUM CHLORIDE 600; 310; 30; 20 MG/100ML; MG/100ML; MG/100ML; MG/100ML
100 INJECTION, SOLUTION INTRAVENOUS CONTINUOUS
Status: DISCONTINUED | OUTPATIENT
Start: 2024-03-12 | End: 2024-03-13

## 2024-03-12 RX ORDER — LIDOCAINE HYDROCHLORIDE 20 MG/ML
INJECTION, SOLUTION INFILTRATION; PERINEURAL AS NEEDED
Status: DISCONTINUED | OUTPATIENT
Start: 2024-03-12 | End: 2024-03-12

## 2024-03-12 RX ORDER — HYDROMORPHONE HYDROCHLORIDE 1 MG/ML
INJECTION, SOLUTION INTRAMUSCULAR; INTRAVENOUS; SUBCUTANEOUS AS NEEDED
Status: DISCONTINUED | OUTPATIENT
Start: 2024-03-12 | End: 2024-03-12

## 2024-03-12 RX ORDER — PROPOFOL 10 MG/ML
INJECTION, EMULSION INTRAVENOUS AS NEEDED
Status: DISCONTINUED | OUTPATIENT
Start: 2024-03-12 | End: 2024-03-12

## 2024-03-12 RX ORDER — ROCURONIUM BROMIDE 10 MG/ML
INJECTION, SOLUTION INTRAVENOUS AS NEEDED
Status: DISCONTINUED | OUTPATIENT
Start: 2024-03-12 | End: 2024-03-12

## 2024-03-12 RX ORDER — DEXTROSE MONOHYDRATE 100 MG/ML
0.3 INJECTION, SOLUTION INTRAVENOUS ONCE AS NEEDED
Status: DISCONTINUED | OUTPATIENT
Start: 2024-03-12 | End: 2024-03-28 | Stop reason: HOSPADM

## 2024-03-12 RX ORDER — CEFEPIME 1 G/50ML
1 INJECTION, SOLUTION INTRAVENOUS EVERY 8 HOURS
Status: DISCONTINUED | OUTPATIENT
Start: 2024-03-12 | End: 2024-03-12

## 2024-03-12 RX ORDER — SODIUM CHLORIDE, SODIUM LACTATE, POTASSIUM CHLORIDE, CALCIUM CHLORIDE 600; 310; 30; 20 MG/100ML; MG/100ML; MG/100ML; MG/100ML
100 INJECTION, SOLUTION INTRAVENOUS CONTINUOUS
Status: DISCONTINUED | OUTPATIENT
Start: 2024-03-12 | End: 2024-03-12 | Stop reason: HOSPADM

## 2024-03-12 RX ORDER — VANCOMYCIN HYDROCHLORIDE 750 MG/150ML
750 INJECTION, SOLUTION INTRAVENOUS EVERY 12 HOURS
Status: DISCONTINUED | OUTPATIENT
Start: 2024-03-12 | End: 2024-03-13

## 2024-03-12 RX ORDER — HYDROMORPHONE HYDROCHLORIDE 1 MG/ML
1 INJECTION, SOLUTION INTRAMUSCULAR; INTRAVENOUS; SUBCUTANEOUS ONCE
Status: DISCONTINUED | OUTPATIENT
Start: 2024-03-12 | End: 2024-03-12

## 2024-03-12 RX ORDER — ENOXAPARIN SODIUM 100 MG/ML
40 INJECTION SUBCUTANEOUS
Status: DISCONTINUED | OUTPATIENT
Start: 2024-03-12 | End: 2024-03-13

## 2024-03-12 RX ORDER — ONDANSETRON HYDROCHLORIDE 2 MG/ML
4 INJECTION, SOLUTION INTRAVENOUS ONCE AS NEEDED
Status: DISCONTINUED | OUTPATIENT
Start: 2024-03-12 | End: 2024-03-12 | Stop reason: HOSPADM

## 2024-03-12 RX ORDER — HYDROMORPHONE HYDROCHLORIDE 1 MG/ML
0.2 INJECTION, SOLUTION INTRAMUSCULAR; INTRAVENOUS; SUBCUTANEOUS EVERY 5 MIN PRN
Status: DISCONTINUED | OUTPATIENT
Start: 2024-03-12 | End: 2024-03-12 | Stop reason: HOSPADM

## 2024-03-12 RX ORDER — MIDAZOLAM HYDROCHLORIDE 1 MG/ML
INJECTION INTRAMUSCULAR; INTRAVENOUS CONTINUOUS PRN
Status: DISCONTINUED | OUTPATIENT
Start: 2024-03-12 | End: 2024-03-12

## 2024-03-12 RX ADMIN — CLINDAMYCIN IN 5 PERCENT DEXTROSE 600 MG: 12 INJECTION, SOLUTION INTRAVENOUS at 02:24

## 2024-03-12 RX ADMIN — ONDANSETRON 4 MG: 2 INJECTION, SOLUTION INTRAMUSCULAR; INTRAVENOUS at 02:51

## 2024-03-12 RX ADMIN — Medication 80 MG: at 11:47

## 2024-03-12 RX ADMIN — HYDROMORPHONE HYDROCHLORIDE 1 MG: 1 INJECTION, SOLUTION INTRAMUSCULAR; INTRAVENOUS; SUBCUTANEOUS at 00:20

## 2024-03-12 RX ADMIN — SODIUM CHLORIDE, POTASSIUM CHLORIDE, SODIUM LACTATE AND CALCIUM CHLORIDE 100 ML/HR: 600; 310; 30; 20 INJECTION, SOLUTION INTRAVENOUS at 16:55

## 2024-03-12 RX ADMIN — SODIUM CHLORIDE, POTASSIUM CHLORIDE, SODIUM LACTATE AND CALCIUM CHLORIDE: 600; 310; 30; 20 INJECTION, SOLUTION INTRAVENOUS at 01:54

## 2024-03-12 RX ADMIN — ROCURONIUM BROMIDE 40 MG: 10 INJECTION, SOLUTION INTRAVENOUS at 02:20

## 2024-03-12 RX ADMIN — HYDROMORPHONE HYDROCHLORIDE 0.5 MG: 1 INJECTION, SOLUTION INTRAMUSCULAR; INTRAVENOUS; SUBCUTANEOUS at 02:55

## 2024-03-12 RX ADMIN — OXYCODONE HYDROCHLORIDE 10 MG: 5 TABLET ORAL at 13:00

## 2024-03-12 RX ADMIN — HYDROMORPHONE HYDROCHLORIDE 0.5 MG: 1 INJECTION, SOLUTION INTRAMUSCULAR; INTRAVENOUS; SUBCUTANEOUS at 03:50

## 2024-03-12 RX ADMIN — CLINDAMYCIN IN 5 PERCENT DEXTROSE 600 MG: 12 INJECTION, SOLUTION INTRAVENOUS at 17:55

## 2024-03-12 RX ADMIN — PROPOFOL 20 MG: 10 INJECTION, EMULSION INTRAVENOUS at 02:56

## 2024-03-12 RX ADMIN — HYDROMORPHONE HYDROCHLORIDE 0.4 MG: 1 INJECTION, SOLUTION INTRAMUSCULAR; INTRAVENOUS; SUBCUTANEOUS at 19:56

## 2024-03-12 RX ADMIN — Medication 120 MCG: at 02:11

## 2024-03-12 RX ADMIN — SUGAMMADEX 200 MG: 100 INJECTION, SOLUTION INTRAVENOUS at 03:17

## 2024-03-12 RX ADMIN — HYDROMORPHONE HYDROCHLORIDE 0.4 MG: 1 INJECTION, SOLUTION INTRAMUSCULAR; INTRAVENOUS; SUBCUTANEOUS at 15:43

## 2024-03-12 RX ADMIN — OXYCODONE HYDROCHLORIDE 10 MG: 5 TABLET ORAL at 23:45

## 2024-03-12 RX ADMIN — MIDAZOLAM 1 MG: 1 INJECTION INTRAMUSCULAR; INTRAVENOUS at 01:55

## 2024-03-12 RX ADMIN — ONDANSETRON HYDROCHLORIDE 4 MG: 2 INJECTION, SOLUTION INTRAVENOUS at 16:27

## 2024-03-12 RX ADMIN — Medication 6 L/MIN: at 03:30

## 2024-03-12 RX ADMIN — ACETAMINOPHEN 975 MG: 325 TABLET ORAL at 13:00

## 2024-03-12 RX ADMIN — SUCCINYLCHOLINE CHLORIDE 160 MG: 20 INJECTION, SOLUTION INTRAMUSCULAR; INTRAVENOUS at 01:54

## 2024-03-12 RX ADMIN — HYDROMORPHONE HYDROCHLORIDE 0.5 MG: 1 INJECTION, SOLUTION INTRAMUSCULAR; INTRAVENOUS; SUBCUTANEOUS at 02:43

## 2024-03-12 RX ADMIN — Medication 80 MCG: at 02:19

## 2024-03-12 RX ADMIN — OXYCODONE HYDROCHLORIDE 10 MG: 5 TABLET ORAL at 08:54

## 2024-03-12 RX ADMIN — OXYCODONE HYDROCHLORIDE 10 MG: 5 TABLET ORAL at 18:12

## 2024-03-12 RX ADMIN — LIDOCAINE HYDROCHLORIDE 100 MG: 20 INJECTION, SOLUTION INFILTRATION; PERINEURAL at 01:55

## 2024-03-12 RX ADMIN — VANCOMYCIN HYDROCHLORIDE 750 MG: 750 INJECTION, SOLUTION INTRAVENOUS at 20:56

## 2024-03-12 RX ADMIN — MEROPENEM 1 G: 1 INJECTION, POWDER, FOR SOLUTION INTRAVENOUS at 16:51

## 2024-03-12 RX ADMIN — MEROPENEM 1 G: 1 INJECTION, POWDER, FOR SOLUTION INTRAVENOUS at 09:49

## 2024-03-12 RX ADMIN — ONDANSETRON 4 MG: 2 INJECTION INTRAMUSCULAR; INTRAVENOUS at 16:27

## 2024-03-12 RX ADMIN — ACETAMINOPHEN 975 MG: 325 TABLET ORAL at 20:55

## 2024-03-12 RX ADMIN — CLINDAMYCIN IN 5 PERCENT DEXTROSE 600 MG: 12 INJECTION, SOLUTION INTRAVENOUS at 09:49

## 2024-03-12 RX ADMIN — Medication 120 MCG: at 02:02

## 2024-03-12 RX ADMIN — FENTANYL CITRATE 100 MCG: 50 INJECTION, SOLUTION INTRAMUSCULAR; INTRAVENOUS at 01:55

## 2024-03-12 RX ADMIN — HYDROMORPHONE HYDROCHLORIDE 0.5 MG: 1 INJECTION, SOLUTION INTRAMUSCULAR; INTRAVENOUS; SUBCUTANEOUS at 04:06

## 2024-03-12 RX ADMIN — PROPOFOL 150 MG: 10 INJECTION, EMULSION INTRAVENOUS at 01:56

## 2024-03-12 RX ADMIN — ACETAMINOPHEN 975 MG: 325 TABLET ORAL at 05:41

## 2024-03-12 RX ADMIN — VANCOMYCIN HYDROCHLORIDE 750 MG: 750 INJECTION, SOLUTION INTRAVENOUS at 08:30

## 2024-03-12 RX ADMIN — SODIUM CHLORIDE, POTASSIUM CHLORIDE, SODIUM LACTATE AND CALCIUM CHLORIDE 100 ML/HR: 600; 310; 30; 20 INJECTION, SOLUTION INTRAVENOUS at 05:15

## 2024-03-12 RX ADMIN — PROPOFOL 30 MG: 10 INJECTION, EMULSION INTRAVENOUS at 02:19

## 2024-03-12 SDOH — SOCIAL STABILITY: SOCIAL INSECURITY: HAVE YOU HAD THOUGHTS OF HARMING ANYONE ELSE?: NO

## 2024-03-12 SDOH — SOCIAL STABILITY: SOCIAL INSECURITY: ARE YOU OR HAVE YOU BEEN THREATENED OR ABUSED PHYSICALLY, EMOTIONALLY, OR SEXUALLY BY ANYONE?: NO

## 2024-03-12 SDOH — HEALTH STABILITY: MENTAL HEALTH: CURRENT SMOKER: 1

## 2024-03-12 SDOH — SOCIAL STABILITY: SOCIAL INSECURITY: ABUSE: ADULT

## 2024-03-12 SDOH — SOCIAL STABILITY: SOCIAL INSECURITY: DO YOU FEEL ANYONE HAS EXPLOITED OR TAKEN ADVANTAGE OF YOU FINANCIALLY OR OF YOUR PERSONAL PROPERTY?: NO

## 2024-03-12 SDOH — SOCIAL STABILITY: SOCIAL INSECURITY: ARE THERE ANY APPARENT SIGNS OF INJURIES/BEHAVIORS THAT COULD BE RELATED TO ABUSE/NEGLECT?: NO

## 2024-03-12 SDOH — SOCIAL STABILITY: SOCIAL INSECURITY: DO YOU FEEL UNSAFE GOING BACK TO THE PLACE WHERE YOU ARE LIVING?: NO

## 2024-03-12 SDOH — SOCIAL STABILITY: SOCIAL INSECURITY: HAS ANYONE EVER THREATENED TO HURT YOUR FAMILY OR YOUR PETS?: NO

## 2024-03-12 SDOH — SOCIAL STABILITY: SOCIAL INSECURITY: WERE YOU ABLE TO COMPLETE ALL THE BEHAVIORAL HEALTH SCREENINGS?: YES

## 2024-03-12 SDOH — SOCIAL STABILITY: SOCIAL INSECURITY: DOES ANYONE TRY TO KEEP YOU FROM HAVING/CONTACTING OTHER FRIENDS OR DOING THINGS OUTSIDE YOUR HOME?: NO

## 2024-03-12 ASSESSMENT — COGNITIVE AND FUNCTIONAL STATUS - GENERAL
MOBILITY SCORE: 24
DAILY ACTIVITIY SCORE: 24
PATIENT BASELINE BEDBOUND: NO

## 2024-03-12 ASSESSMENT — PAIN SCALES - GENERAL
PAINLEVEL_OUTOF10: 2
PAIN_LEVEL: 7
PAINLEVEL_OUTOF10: 4
PAINLEVEL_OUTOF10: 10 - WORST POSSIBLE PAIN
PAINLEVEL_OUTOF10: 8
PAINLEVEL_OUTOF10: 10 - WORST POSSIBLE PAIN
PAINLEVEL_OUTOF10: 7
PAINLEVEL_OUTOF10: 10 - WORST POSSIBLE PAIN
PAINLEVEL_OUTOF10: 0 - NO PAIN
PAINLEVEL_OUTOF10: 2
PAINLEVEL_OUTOF10: 8
PAINLEVEL_OUTOF10: 10 - WORST POSSIBLE PAIN
PAINLEVEL_OUTOF10: 7

## 2024-03-12 ASSESSMENT — ACTIVITIES OF DAILY LIVING (ADL)
DRESSING YOURSELF: NEEDS ASSISTANCE
ADEQUATE_TO_COMPLETE_ADL: YES
LACK_OF_TRANSPORTATION: NO
WALKS IN HOME: NEEDS ASSISTANCE
JUDGMENT_ADEQUATE_SAFELY_COMPLETE_DAILY_ACTIVITIES: YES
HEARING - LEFT EAR: FUNCTIONAL
GROOMING: NEEDS ASSISTANCE
TOILETING: NEEDS ASSISTANCE
BATHING: NEEDS ASSISTANCE
HEARING - RIGHT EAR: FUNCTIONAL
PATIENT'S MEMORY ADEQUATE TO SAFELY COMPLETE DAILY ACTIVITIES?: YES
FEEDING YOURSELF: NEEDS ASSISTANCE

## 2024-03-12 ASSESSMENT — PAIN DESCRIPTION - LOCATION
LOCATION: LEG
LOCATION: PERINEUM
LOCATION: GROIN
LOCATION: GROIN
LOCATION: PERINEUM

## 2024-03-12 ASSESSMENT — LIFESTYLE VARIABLES
HOW MANY STANDARD DRINKS CONTAINING ALCOHOL DO YOU HAVE ON A TYPICAL DAY: PATIENT DECLINED
AUDIT-C TOTAL SCORE: -1
HOW OFTEN DO YOU HAVE 6 OR MORE DRINKS ON ONE OCCASION: PATIENT DECLINED
HOW OFTEN DO YOU HAVE A DRINK CONTAINING ALCOHOL: PATIENT DECLINED
SKIP TO QUESTIONS 9-10: 0
AUDIT-C TOTAL SCORE: -1

## 2024-03-12 ASSESSMENT — PAIN - FUNCTIONAL ASSESSMENT
PAIN_FUNCTIONAL_ASSESSMENT: 0-10

## 2024-03-12 ASSESSMENT — PAIN DESCRIPTION - ORIENTATION
ORIENTATION: RIGHT
ORIENTATION: RIGHT
ORIENTATION: LEFT
ORIENTATION: RIGHT
ORIENTATION: LEFT;RIGHT
ORIENTATION: RIGHT

## 2024-03-12 ASSESSMENT — PATIENT HEALTH QUESTIONNAIRE - PHQ9
1. LITTLE INTEREST OR PLEASURE IN DOING THINGS: NOT AT ALL
SUM OF ALL RESPONSES TO PHQ9 QUESTIONS 1 & 2: 0
2. FEELING DOWN, DEPRESSED OR HOPELESS: NOT AT ALL

## 2024-03-12 NOTE — ED PROVIDER NOTES
HPI   Chief Complaint   Patient presents with    Wound Infection     Pt transferred from [facility] ER for groin wound infection with gangrene.       Patient is a 58-year-old female with no self-reported past medical history (elevated Hgb A1c in past but patient adamant she does not diabetes) presenting as a transfer from [facility] for concern for gas gangrene in the groin.  Patient reports 1 week of worsening pain and swelling in the area and restarted clindamycin last week by PCP.  Endorses been progressively worse prompting presentation.  CT at outside hospital concerning for gas gangrene and transferred at advice of Dr. Buchanan.                          Oscar Coma Scale Score: 15                     Patient History   No past medical history on file.  No past surgical history on file.  No family history on file.  Social History     Tobacco Use    Smoking status: Not on file    Smokeless tobacco: Not on file   Substance Use Topics    Alcohol use: Not on file    Drug use: Not on file       Physical Exam   ED Triage Vitals [03/11/24 2300]   Temperature Heart Rate Respirations BP   36.6 °C (97.9 °F) 94 20 (!) 150/99      Pulse Ox Temp Source Heart Rate Source Patient Position   96 % Oral -- --      BP Location FiO2 (%)     -- --       Physical Exam  Vitals and nursing note reviewed.   Constitutional:       General: She is not in acute distress.     Appearance: She is well-developed.   HENT:      Head: Normocephalic and atraumatic.   Eyes:      Conjunctiva/sclera: Conjunctivae normal.   Cardiovascular:      Rate and Rhythm: Normal rate.   Pulmonary:      Effort: Pulmonary effort is normal.   Genitourinary:     Comments: Refused examination for provider.  Musculoskeletal:      Cervical back: Neck supple.   Skin:     General: Skin is warm and dry.      Capillary Refill: Capillary refill takes less than 2 seconds.   Neurological:      Mental Status: She is alert.         ED Course & MDM   Diagnoses as of 03/12/24 1713   Gas  gangrene (CMS/[facility])       Medical Decision Making  Patient is a 58-year-old female with no self-reported past medical history (elevated Hgb A1c in past but patient adamant she does not diabetes) presenting as a transfer from [facility] for concern for gas gangrene in the groin.  ACS consulted from ED.  Pain controlled with Dilaudid and antibiotic treatment augmented with clindamycin.  Patient otherwise generally nontoxic-appearing, vital signs within normal limits in ED.  Did have brief desats after opioids without change in respiratory effort or respiratory rate.  Briefly started on nasal cannula oxygen.  Patient taken urgently to OR for debridement by ACS.    Patient seen and discussed with Dr. Jacqui Adkins MD, PhD  Emergency Medicine PGY2          Procedure  Procedures       José Adkins MD  Resident  03/12/24 0446       José Adkins MD  Resident  03/12/24 0447       José Adkins MD  Resident  03/12/24 1767      -------------------------    This patient was seen by the resident physician. I have seen and examined the patient, agree with the workup, evaluation, management and diagnosis. The care plan has been discussed and I concur.    My assessment reveals the following:    HPI:  Patient is a 59 y/o female transferred from [facility] ED for ACS evaluation of possible gas gangrene in groin per CT findings. Questionable history of DM. Started on Clinda by urgent care, but was not improving, so presenting to [facility] ED. Started on Vanc and Cefepime by [facility] ED. Upon [facility] ED evaluation, patient requesting pain meds. States she is unhappy to be here and that she had to be transferred because an anesthesiologist was not available at referring hospital. Patient would like to eat and drink something because she has had not had anything to eat or drink all day.    PE: Patient declines physical exam by this physician.     Medical Decision Making:  - IV  - Pain meds  - ACS consult  - Preop labs  - Preop CXR  -  Nursing reports patient refused labs and CXR and EKG.  - Patient desaturated to mid 80s after receiving opiates. No symptoms. No h/o asthma, COPD, or JOLIE. Denies SOB. Placed on 4L NC with rise in pulse to mid 90s.   - Admit under ACS, to OR.     Differential Diagnoses Considered: Gas gangrene    Chronic Medical Conditions Significantly Affecting Care: Possible DM    External Records Reviewed: I reviewed recent and relevant outside records including: Imaging results from [facility]    Escalation of Care:  Appropriate for inpatient management    Discussion of Management with Other Providers:   I discussed the patient/results with: ACS    MD Froy Jones MD  03/14/24 0152

## 2024-03-12 NOTE — CONSULTS
"Vancomycin Dosing by Pharmacy- INITIAL    Susan Farfan is a 58 y.o. year old female who Pharmacy has been consulted for vancomycin dosing for cellulitis, skin and soft tissue. Based on the patient's indication and renal status this patient will be dosed based on a goal AUC of 400-600.     Renal function is currently stable.    Visit Vitals  /70   Pulse 91   Temp 36 °C (96.8 °F) (Temporal)   Resp 14        Lab Results   Component Value Date    CREATININE 0.90 03/11/2024    CREATININE 0.78 08/04/2022        Patient weight is No results found for: \"PTWEIGHT\"    No results found for: \"CULTURE\"     No intake/output data recorded.  [unfilled]    No results found for: \"PATIENTTEMP\"       Assessment/Plan     Patient has already been given a loading dose of 2000 mg.  Will initiate vancomycin maintenance,  750 mg every 12 hours.    This dosing regimen is predicted by [employer] to result in the following pharmacokinetic parameters:  Regimen: 750 mg IV every 12 hours.  Start time: 09:33 on 03/12/2024  Exposure target: AUC24 (range)400-600 mg/L.hr   AUC24,ss: 437 mg/L.hr  Probability of AUC24 > 400: 57 %  Ctrough,ss: 13.2 mg/L  Probability of Ctrough,ss > 20: 28 %  Probability of nephrotoxicity (Lodise VERONICA 2009): 8 %      Follow-up level will be ordered on 3/13 at AM labs unless clinically indicated sooner.  Will continue to monitor renal function daily while on vancomycin and order serum creatinine at least every 48 hours if not already ordered.  Follow for continued vancomycin needs, clinical response, and signs/symptoms of toxicity.       Hamzah Dacosta, PharmD       "

## 2024-03-12 NOTE — ANESTHESIA PREPROCEDURE EVALUATION
Patient: Susan Farfan    Procedure Information       Date/Time: 03/12/24 0100    Procedure: Debridement Perineum (Right)    Location: [facility] OR 11 / [facility] OR    Surgeons: Yosi Buchanan, DO            Relevant Problems   Anesthesia (within normal limits)      Cardiovascular   (+) HTN (hypertension)   (+) Hyperlipidemia      Endocrine  Pre Diabetic     (+) Obesity      Musculoskeletal   (+) Chronic low back pain   (+) Chronic neck pain   (+) Chronic pain syndrome   (+) Spinal stenosis      Infectious Disease   (+) Necrotizing soft tissue infection       Clinical information reviewed:                   NPO Detail:  No data recorded     Physical Exam    Airway  Mallampati: III  Neck ROM: full     Cardiovascular - normal exam     Dental - normal exam     Pulmonary   (+) decreased breath sounds     Abdominal   (+) obese             Anesthesia Plan    History of general anesthesia?: yes  History of complications of general anesthesia?: no    ASA 3     general     The patient is a current smoker.    Anesthetic plan and risks discussed with patient.  Use of blood products discussed with patient who.    Plan discussed with attending.

## 2024-03-12 NOTE — SIGNIFICANT EVENT
S: Admitted overnight to ICU post after debridement of nec/fasc of the groin. Arrived to unit extubated, no pressors, Aox3. Complaint of wanting sleep and groin pain at surgical site. Pt. Denies headache, dizziness, chest pain, sob, or n/v.     O: Aox3. Respirations even and unlabored, thorax symmetric, on 6L simple face mash with saturation 97%. NSR. Obese abd, soft, nontender, nondistended. Petersen in place draining clear yellow urine. MAEx4. Skin warm to touch. Groin surgical incision with OR dressing in place, mesh panties, no drainage observed on dressing.     A/P:    Problems:   Necrotizing soft tissue infection of the groin  Hyperglycemia  Hx. Pt. Denies any past medical history    Plan:     Neuro: Aox3. Pain control scheduled tylenol, oxy 5/10 as needed, dilaudid breakthrough.     Resp: On 6L simple face mask. Maintain spo2 greater than 92%. IS.    Cardiac: HDS. No pressors. No active issues.    GI: Clear liquid diet. BR. LR 100ml/hr.    /FEN: Petersen in place for strict I/Os and to protect surgical debridement of groin from urine. Monitor electrolytes and replete as indicated.    Heme: No active issues.     Endo: Hyperglycemia, no DM diagnosis. A1C 7. SSI ordered. Pt. Has been refusing insulin coverage, stated she dos not have diabetes.    ID: Necrotizing soft tissue infection of the groin. Started overnight Vanco, cefepime, clindamycin. Blood cultures x2 obtained. Culture of surgical wound obtained in OR. ATB plan today continue vanco for 4 days after source control. Stopped cefepime ad switched to meropenem per hospital NSTI ATB guidelines. Alaina for 72 ours total.     Musk/Skin: PT/OT as able. Pending return to OR plan with ACS.     Proph: LVX, scds.     Lines: Petersen, PIV    Social: smokes 15 cigarettes a day since 6th grade.     Dispo: Possible floor this afternoon if remains stable.     Jennifer Frey, APRN-CNP  Trauma Surgery   [id number]    Total face to face time spent with patient/family of 35 minutes  critical care time, with >50% of the time spent discussing plan of care/management, counseling/educating on disease processes, explaining results of diagnostic testing.

## 2024-03-12 NOTE — CARE PLAN
Problem: Pain  Goal: Takes deep breaths with improved pain control throughout the shift  Outcome: Progressing  Goal: Turns in bed with improved pain control throughout the shift  Outcome: Progressing  Goal: Walks with improved pain control throughout the shift  Outcome: Progressing  Goal: Performs ADL's with improved pain control throughout shift  Outcome: Progressing  Goal: Participates in PT with improved pain control throughout the shift  Outcome: Progressing  Goal: Free from opioid side effects throughout the shift  Outcome: Progressing  Goal: Free from acute confusion related to pain meds throughout the shift  Outcome: Progressing     Problem: Skin  Goal: Decreased wound size/increased tissue granulation at next dressing change  Outcome: Progressing  Goal: Participates in plan/prevention/treatment measures  Outcome: Progressing  Goal: Prevent/manage excess moisture  Outcome: Progressing  Goal: Prevent/minimize sheer/friction injuries  Outcome: Progressing  Goal: Promote/optimize nutrition  Outcome: Progressing  Goal: Promote skin healing  Outcome: Progressing   The patient's goals for the shift include pain control    The clinical goals for the shift include  improved pain control

## 2024-03-12 NOTE — PROGRESS NOTES
[facility]  TRAUMA ICU - PROGRESS NOTE    Patient Name: Susan Farfan  MRN: [id number]  Admit Date: 311  : 1965  AGE: 58 y.o.   GENDER: female  ==============================================================================   [###USE THIS SECTION FOR TRAUMA PATIENTS ONLY###]  MECHANISM OF INJURY:   Susan Farfan is a 57 y/o Female who presented to [facility] as a transfer from OSH ([facility])  after a 1.5 week hx of a boil on her Right groin/perineum. Per reports presented to the OSH emergency department with a chief complaint of an infection in her groin which has been present for over a week. Pt states that is draining and foul-smelling. She saw her primary doctor was placed on a weeklong course of clindamycin without improvement. CT ABD/Pelvis at OSH concerning for gas gangrene formation in the Right thigh groin. On arrival to ED Pt was seen by ACS who is recommending emergent OR for debridement.      INJURIES:   Gaseous gangrene infection     OTHER MEDICAL PROBLEMS:  DM  HTN  HLD  Obesity  Tobacco abuse   Staph infection requiring operative debridement of breast/chest 10 yrs ago     INCIDENTAL FINDINGS:  None    PROCEDURES/OR Course:    3/12: Perineal debridement with ACS (Dewayne)     ==============================================================================  TODAY'S ASSESSMENT AND PLAN OF CARE:  Susan Farfan is a 58 y.o. female in the ICU due to: Immediate post operative care and close hemodynamic monitoring.     NEURO/PAIN/SEDATION:   -Cont Scheduled Tylenol   -PRN Oxy 5/10, Dilaudid BT     RESPIRATORY:   -Cont SPO2 monitoring  -Maintain SPO2 > 92% provide supplemental O2 as needed  -Arrived to unit on simple face mask 6L    CARDIOVASCULAR:   -Cont TELE monitoring  -MAP goals > 65    GI:   -Keep NPO  -Bowel Reg with Senna and Miralax     :    -Cont Petersen, can likely DC this morning if Pt willing   -Maintain UO 0.5-1ml/kg/hr    FEN:   -Cont mIVF LR @ 100  -Check and  "replace lytes as indicated   -NPO in immediate pos top phase    HEMATOLOGIC:   -Maintain active type and screen   -FU post op labs     ENDOCRINE: Hx: of DM (Pt. Will decline hx and states she will refuse insulin admin)   -Q 4Hr SSI while NPO  -FU HA1c    MUSCULOSKELETAL/SKIN:   -Wound packed in OR, Dakins soaked Kerlix  -FU ACS recs for dressing changes   -Turn and position per protocol     INFECTIOUS DISEASE:   -WBC on arrival 12.3, Lactate 1.2  -FU Blood cultures obtained 3/11  -Cont Vanc, Cefepime, Clindamycin  -Monitor for fevers/SIRS    GI PROPHYLAXIS:   -No indication at this time, should Pt remain NPO for extended period can readdress     DVT PROPHYLAXIS:   -SCD's  -Lovenox Daily     DISPOSITION: Cont ICU care and MGMT.     Pt. Seen and plan discussed with my attending Dr. Dewayne Villarreal, APRN-[facility]  Trauma Critical Care  [id number]  ==============================================================================  CHIEF COMPLAINT / OVERNIGHT EVENTS / HPI:   Pt. Admitted to TSU s/p OR with ACS for debridement of NSTI of Right buttocks. Pt Arrives to TSICU drowsy but arousalable, is irritated with questioning and assessments. Pt on 6L Simple face mask quickly weaned to 2l, denies dyspnea, SOB. Pt. HDS, non toxic appearing.     MEDICAL HISTORY / ROS:  Admission history and ROS reviewed. Pertinent changes as follows:    PHYSICAL EXAM:  Heart Rate:  []   Temp:  [36.3 °C (97.3 °F)-36.8 °C (98.2 °F)]   Resp:  [18-20]   BP: (140-150)/(82-99)   Height:  [165.1 cm (5' 5\")]   Weight:  [127 kg (281 lb 1.4 oz)]   SpO2:  [96 %-98 %]   Physical Exam  Constitutional:       General: She is not in acute distress.     Appearance: She is obese. She is not ill-appearing.   HENT:      Head: Normocephalic and atraumatic.      Right Ear: External ear normal.      Left Ear: External ear normal.      Nose: Nose normal.      Mouth/Throat:      Mouth: Mucous membranes are moist.      Pharynx: Oropharynx is clear.   Eyes:      " Pupils: Pupils are equal, round, and reactive to light.   Cardiovascular:      Rate and Rhythm: Normal rate and regular rhythm.      Pulses: Normal pulses.   Pulmonary:      Effort: Pulmonary effort is normal. No respiratory distress.      Breath sounds: Normal breath sounds.   Abdominal:      General: There is no distension.      Palpations: Abdomen is soft.      Tenderness: There is no abdominal tenderness.      Comments: Obese     Musculoskeletal:         General: Normal range of motion.      Cervical back: Neck supple.   Skin:     General: Skin is warm and dry.      Capillary Refill: Capillary refill takes less than 2 seconds.      Coloration: Skin is pale.      Comments: Surgical Incision to Right buttocks, paced and dressed.      Neurological:      Mental Status: She is oriented to person, place, and time.   Psychiatric:      Comments: Irritated            IMAGING SUMMARY:  (summary of new imaging findings, not a copy of dictation)    LABS:  Results from last 7 days   Lab Units 03/11/24 1912   WBC AUTO x10*3/uL 12.3*   HEMOGLOBIN g/dL 14.6   HEMATOCRIT % 43.4   PLATELETS AUTO x10*3/uL 303   NEUTROS PCT AUTO % 73.8   LYMPHS PCT AUTO % 16.4   MONOS PCT AUTO % 8.5   EOS PCT AUTO % 0.7         Results from last 7 days   Lab Units 03/11/24 1912   SODIUM mmol/L 137   POTASSIUM mmol/L 4.7   CHLORIDE mmol/L 101   CO2 mmol/L 24   BUN mg/dL 13   CREATININE mg/dL 0.90   CALCIUM mg/dL 9.4   PROTEIN TOTAL g/dL 8.0*   BILIRUBIN TOTAL mg/dL 0.4   ALK PHOS U/L 133*   ALT U/L 17   AST U/L 16   GLUCOSE mg/dL 117*     Results from last 7 days   Lab Units 03/11/24 1912   BILIRUBIN TOTAL mg/dL 0.4         I have reviewed all medications, laboratory results, and imaging pertinent for today's encounter.

## 2024-03-12 NOTE — ANESTHESIA POSTPROCEDURE EVALUATION
Patient: Susan Farfan    Procedure Summary       Date: 03/12/24 Room / Location: [facility] OR 11 / [facility] OR    Anesthesia Start: 0145 Anesthesia Stop: 0332    Procedure: Debridement Perineum (Bilateral: Groin) Diagnosis:       Necrotizing soft tissue infection      (Necrotizing soft tissue infection [M79.89])    Surgeons: Yosi Buchanan DO Responsible Provider: Prosper Joshi DO    Anesthesia Type: general ASA Status: 3            Anesthesia Type: general    Vitals Value Taken Time   /79 03/12/24 0332   Temp 36.2 03/12/24 0332   Pulse 97 03/12/24 0329   Resp 15 03/12/24 0332   SpO2 94 % 03/12/24 0329   Vitals shown include unvalidated device data.    Anesthesia Post Evaluation    Patient location during evaluation: PACU  Patient participation: complete - patient participated  Level of consciousness: awake and alert  Pain score: 7  Pain management: inadequate  Multimodal analgesia pain management approach  Airway patency: patent  Two or more strategies used to mitigate risk of obstructive sleep apnea  Cardiovascular status: acceptable  Respiratory status: face mask  Hydration status: acceptable  Postoperative Nausea and Vomiting: none        No notable events documented.

## 2024-03-12 NOTE — PROGRESS NOTES
Physical Therapy                 Therapy Communication Note    Patient Name: Susan Farfan  MRN: [id number]  Today's Date: 3/12/2024     Discipline: Physical Therapy    Missed Visit Reason:  (1403: Pt adamantly refusing participation in PT eval/mobility. Pt attempting to provide education regarding importance of mobility throughout hospital course though pt continued to refuse. RN and MD made aware.)    Missed Time: Attempt

## 2024-03-12 NOTE — PROGRESS NOTES
"[facility]  ACUTE CARE SURGERY - PROGRESS NOTE    Patient Name: Susan Farfan  MRN: [id number]  Admit Date: 311  : 1965  AGE: 58 y.o.   GENDER: female  ==============================================================================  TODAY'S ASSESSMENT AND PLAN OF CARE:    Susan Farfan is a 58F PMHx smoking, BG elevations, presents as a transfer for NSTI of perineum s/p debridement (3/12/24). Patient was brought to ICU for desaturation, improving currently 6L on Venti-mask, otherwise hemodynamically stable. HgbA1C was 7.0.    WTD BID dressing changes  Wean O2 as tolerated  Continue abx: Vancomycin, Cefepime, Clindamycin  Follow up Bcx and Intra-op Cx 3/12    Ismael Hernández MD  PGY1  General Surgery  Acute Care Surgery, Pager [phone]    ==============================================================================  CHIEF COMPLAINT / EVENTS LAST 24HRS / HPI:    Taken to OR overnight for debridement. Doing well post-operatively, currently on 6L O2 Venti-mask    MEDICAL HISTORY / ROS:   Admission history and ROS reviewed. Pertinent changes as follows:  None    PHYSICAL EXAM:  Heart Rate:  []   Temp:  [35.6 °C (96.1 °F)-36.8 °C (98.2 °F)]   Resp:  [10-20]   BP: (116-176)/(63-99)   Height:  [165.1 cm (5' 5\")]   Weight:  [127 kg (281 lb 1.4 oz)]   SpO2:  [90 %-98 %]     Physical Exam:    Constitutional - sitting in bed, NAD  Cards- RRR on tele  Resp- nonlabored breathing on 6L O2 venti-mask  Extremities- NEWTON   Skin- warm, dry   Neuro- alert and oriented x3   Psych- appropriate mood   Wound assessment - deferred    LABS:  Results for orders placed or performed during the hospital encounter of 24 (from the past 24 hour(s))   POCT GLUCOSE   Result Value Ref Range    POCT Glucose 160 (H) 74 - 99 mg/dL   POCT GLUCOSE   Result Value Ref Range    POCT Glucose 176 (H) 74 - 99 mg/dL   CBC and Auto Differential   Result Value Ref Range    WBC 13.1 (H) 4.4 - 11.3 x10*3/uL    " nRBC 0.0 0.0 - 0.0 /100 WBCs    RBC 4.28 4.00 - 5.20 x10*6/uL    Hemoglobin 13.3 12.0 - 16.0 g/dL    Hematocrit 42.8 36.0 - 46.0 %     80 - 100 fL    MCH 31.1 26.0 - 34.0 pg    MCHC 31.1 (L) 32.0 - 36.0 g/dL    RDW 14.6 (H) 11.5 - 14.5 %    Platelets 277 150 - 450 x10*3/uL    Neutrophils % 92.4 40.0 - 80.0 %    Immature Granulocytes %, Automated 0.5 0.0 - 0.9 %    Lymphocytes % 5.3 13.0 - 44.0 %    Monocytes % 1.4 2.0 - 10.0 %    Eosinophils % 0.1 0.0 - 6.0 %    Basophils % 0.3 0.0 - 2.0 %    Neutrophils Absolute 12.09 (H) 1.20 - 7.70 x10*3/uL    Immature Granulocytes Absolute, Automated 0.07 0.00 - 0.70 x10*3/uL    Lymphocytes Absolute 0.69 (L) 1.20 - 4.80 x10*3/uL    Monocytes Absolute 0.18 0.10 - 1.00 x10*3/uL    Eosinophils Absolute 0.01 0.00 - 0.70 x10*3/uL    Basophils Absolute 0.04 0.00 - 0.10 x10*3/uL   Coagulation Screen   Result Value Ref Range    Protime 11.9 9.8 - 12.8 seconds    INR 1.1 0.9 - 1.1    aPTT 23 (L) 27 - 38 seconds   Hemoglobin A1c   Result Value Ref Range    Hemoglobin A1C 7.0 (H) see below %    Estimated Average Glucose 154 Not Established mg/dL   Calcium, ionized   Result Value Ref Range    POCT Calcium, Ionized 0.99 (L) 1.1 - 1.33 mmol/L   POCT GLUCOSE   Result Value Ref Range    POCT Glucose 172 (H) 74 - 99 mg/dL     MEDICATIONS:  Current Facility-Administered Medications   Medication Dose Route Frequency Provider Last Rate Last Admin    acetaminophen (Tylenol) tablet 975 mg  975 mg oral q8h JOSELUIS Oconnor-BRISEYDA        clindamycin (Cleocin) 600 mg in dextrose 5% water 50 mL  600 mg intravenous q8h TRENTON Oconnor        dextrose 10 % in water (D10W) infusion  0.3 g/kg/hr intravenous Once PRN TRENTON Don        dextrose 50 % injection 25 g  25 g intravenous q15 min PRN TRENTON Don        enoxaparin (Lovenox) syringe 40 mg  40 mg subcutaneous q24h [facility] JOSELUIS Don-CNP   40 mg at 03/12/24 0828    glucagon (Glucagen) injection  1 mg  1 mg intramuscular q15 min PRN TRENTON Don        HYDROmorphone (Dilaudid) injection 0.4 mg  0.4 mg intravenous q4h PRN TRENTON Don        insulin lispro (HumaLOG) injection 0-10 Units  0-10 Units subcutaneous q4h TRENTON Don   2 Units at 03/12/24 0831    lactated Ringer's infusion  100 mL/hr intravenous Continuous TRENTON Don 100 mL/hr at 03/12/24 0515 100 mL/hr at 03/12/24 0515    meropenem (Merrem) 1 g in sodium chloride 0.9 % 100 mL IV  1 g intravenous q8h TRENTON Oconnor        naloxone (Narcan) injection 0.2 mg  0.2 mg intravenous q5 min PRN TRENTON Don        oxyCODONE (Roxicodone) immediate release tablet 10 mg  10 mg oral q4h PRN TRENTON Don   10 mg at 03/12/24 0854    oxyCODONE (Roxicodone) immediate release tablet 5 mg  5 mg oral q4h PRN TRENTON Don        oxygen (O2) therapy   inhalation Continuous PRN - O2/gases TRENTON Don        polyethylene glycol (Glycolax, Miralax) packet 17 g  17 g oral Daily TRENTON Don        sennosides-docusate sodium (Nicolle-Colace) 8.6-50 mg per tablet 2 tablet  2 tablet oral BID TRENTON Don        vancomycin (Vancocin) placeholder   miscellaneous Daily PRN TRENTON Don        vancomycin in dextrose 5 % (Vancocin) IVPB 750 mg  750 mg intravenous q12h TRENTON Don 200 mL/hr at 03/12/24 0830 750 mg at 03/12/24 0830       IMAGING SUMMARY:  (summary of new imaging findings, not a copy of dictation)    CXR 3/12/24    IMPRESSION:  1.  No acute cardiopulmonary process.    I have reviewed all laboratory and imaging results ordered/pertinent for today's encounter.

## 2024-03-12 NOTE — OP NOTE
Debridement Perineum (B) Operative Note     Date: 3/12/2024  OR Location: [facility] OR    Name: Susan Farfan, : 1965, Age: 58 y.o., MRN: [id number], Sex: female    Diagnosis  Pre-op Diagnosis     * Necrotizing soft tissue infection [M79.89] Post-op Diagnosis     * Necrotizing soft tissue infection [M79.89]     Procedures  Debridement Perineum  81871 - LA DEBRIDEMENT SUBCUTANEOUS TISSUE 1ST 20 SQ CM/<      Surgeons      * Yosi Buchanan - Primary    Resident/Fellow/Other Assistant:  Surgeon(s) and Role:     * Pepe Quijano MD - Resident - Assisting    Procedure Summary  Anesthesia: General  ASA: III  Anesthesia Staff: Anesthesiologist: Prosper Joshi DO  Anesthesia Resident: Juancho Chapa MD; Ravi Hurd MD  Estimated Blood Loss: 50mL  Intra-op Medications:   Administrations occurring from 0100 to 0255 on 24:   Medication Name Total Dose   clindamycin (Cleocin) 600 mg in dextrose 5% water 50 mL 600 mg   ondansetron (Zofran) injection  - Omnicell Override Pull Cannot be calculated              Anesthesia Record               Intraprocedure I/O Totals          Output    Urine 350 mL    Est. Blood Loss 50 mL    Total Output 400 mL          Specimen:   ID Type Source Tests Collected by Time   A : NECROTIZING WOUND FLUID Tissue DECUBITUS ULCER TISSUE/WOUND CULTURE/SMEAR Yosi Buchanan DO 3/12/2024 0309        Staff:   Circulator: Daphne Pardo RN  Scrub Person: Ann Rodriguez         Drains and/or Catheters: * None in log *    Tourniquet Times: Not applicable       Implants: none    Findings: Large eschar of right upper inner thigh area with surrounding erythema and induration. Necrotic tissue underneath with tracking to anterior pubic area and perineum. Wound about 18cm x 9 cm x 4cm after debridement, deepest part down to fascia but not involving fascia itself. Left labia majora with superficial erosion about 3cm x 1.5cm.     Indications: Susan Farfan is an 58 y.o. female who is having surgery for  Necrotizing soft tissue infection [M79.89]. Patient developed a broil in her right groin about 1 1/2 weeks ago which spontaneously drained initially. However, she had persistent pain and swelling for which she was evaluated in the urgent care and given antibiotics. She continued to have worsening burning pain and swelling, and eventually decided to present to hospital for evaluation. On exam she has a large necrotic eschar in right upper inner thigh with surrounding erythema and induration. On labs, her WBC was 12.3, lactate 1.2. Vitals were within normal limits. CT scan showed gas gangrene of medial right upper thigh extending into right groin with significant soft tissue gas consistent with necrotizing soft tissue infection. Patient was started on antibiotics and decision was made to perform surgical debridement.     The patient was seen in the preoperative area. The risks, benefits, complications, treatment options, non-operative alternatives, expected recovery and outcomes were discussed with the patient. The possibilities of reaction to medication, pulmonary aspiration, injury to surrounding structures, bleeding, recurrent infection, the need for additional procedures, failure to diagnose a condition, and creating a complication requiring transfusion or operation were discussed with the patient. The patient concurred with the proposed plan, giving informed consent.  The site of surgery was properly noted/marked if necessary per policy. The patient has been actively warmed in preoperative area. Preoperative antibiotics have been ordered and given within 2 hours of incision. Venous thrombosis prophylaxis are not indicated.    Procedure Details:     The patient was brought to the operating room. A preprocedural timeout was performed identifying the patient, procedure, antibiotics and DVT prophylaxis. General anesthesia was induced by the anesthesia team. The patient was placed in lithotomy position. The bilateral  groin area was sterilely prepped with betadine solution and draped in usual fashion. An additional timeout was performed.     A large necrotic eschar was noted in right upper inner thigh and another open wound noted in the groin fold. The eschar was sharply excised with #10 blade which exposed necrotic tissue underneath. Wound culture was collected. Devitalized skin, subcutaneous fat and tissue were debrided to healthy surface using combination of knife, cautery and curette. Multiple tracks towards anterior pubic area and perineum were noted during the process and debrided. The wound measures 18cm x 9cm x 4cm. Deepest area reaching fascia but not involving fascia itself. The wound was irrigated and hemostasis was achieved using electrocautery. The wound was then packed with Dakins soaked Kerlix rolls and covered with abd pads and mesh panty.     At the conclusion of the procedure, all instrument and laparotomy pad counts were correct.      Complications:  None; patient tolerated the procedure well.    Disposition: PACU - hemodynamically stable.  Condition: stable       Attending Attestation:     Yosi Buchanan  Phone Number: [phone]

## 2024-03-12 NOTE — CONSULTS
[facility]  ACUTE CARE SURGERY - HISTORY AND PHYSICAL / CONSULT    Patient Name: Susan Farfan  MRN: [id number]  Admit Date: 311  : 1965  AGE: 58 y.o.   GENDER: female  ==============================================================================  TODAY'S ASSESSMENT AND PLAN OF CARE:  Pt is 57yo F with hx of smoking and BG elevations who presents as transfer from OSH after 1.5 weeks of boil that began draining and developed into a soft tissue infection requiring debridement. She has leukocytosis to 12.3 and tachycardia resolved with fluids.    PLAN  - emergent OR with ACS for debridement of R perineum; discussed packing for wound care postop and multiple planned returns to OR if needed; she deemed her brother Earle in chart ([phone]) as proxy decision maker if needed  - admit to ACS after OR  - vanc/cefepime/clindamycin for empiric coverage of polymicrobial soft tissue infection    Seen and discussed with Attending, Dr. Buchanan.     Pager [phone]   Juan Ferrari MD     ==============================================================================  CHIEF COMPLAINT/REASON FOR CONSULT:  Pt is a 57yo F with hx of smoking and BG elevations who presents as transfer from OSH for concern for NSTI. PT had a R groin boil 1.5 weeks ago that she applied a warm compress to and it began to drain purulent fluid. When the pain and swelling did not resolve, she went to an urgent care 1 week ago and was prescribed clindamycin and mupirocin cream. Unfortunately, stinging burning pain and swelling worsened over the last week, prompting her to present to the OSH ED and she was transferred to  for further management.     She denies fevers, chills, and her tachycardia at  resolved with fluid. She received cefepime and vancomycin. Allergic to amoxicillin (red rash and swelling 10 yrs ago)    In the ED, she is afebrile, not tachycardic, without hypotension. Leukocytosis to 12.3 and no other  abnormal labs - normal lactate, no LUKAS, no electrolyte abnormalities.     PAST MEDICAL HISTORY:   PMH: HTN, pre-diabetes      PSH: Staph infection requiring operative debridement of breast/chest 10 yrs ago    FH: declined to answer    SOCIAL HISTORY:    Smoking/alcohol/drug use: declined to answer       MEDICATIONS: clindamycin and ibuprofen    ALLERGIES:   Allergies   Allergen Reactions    Amoxicillin Rash       REVIEW OF SYSTEMS:  Review of Systems    See above   PHYSICAL EXAM:  Physical Exam    Physical Exam     Constitutional - mildly uncomfortable at rest, in moderate distress from pain during perineal exam  Cards- regular rate   Resp- nonlabored breathing on room air   Abdomen- not distended  Extremities- NEWTON   Skin- warm, dry   Neuro- alert and oriented x3   Psych- appropriate mood   Tubes/lines- PIV    Perineum - R groin wound - estimated to be 20 cm of fibrinous exudate and eschar with surrounding erythema      IMAGING SUMMARY:  (summary of findings, not a copy of dictation)  CT - locules of air tracking from environment through superficial tissue     LABS:  Results from last 7 days   Lab Units 03/11/24 1912   WBC AUTO x10*3/uL 12.3*   HEMOGLOBIN g/dL 14.6   HEMATOCRIT % 43.4   PLATELETS AUTO x10*3/uL 303   NEUTROS PCT AUTO % 73.8   LYMPHS PCT AUTO % 16.4   MONOS PCT AUTO % 8.5   EOS PCT AUTO % 0.7         Results from last 7 days   Lab Units 03/11/24 1912   SODIUM mmol/L 137   POTASSIUM mmol/L 4.7   CHLORIDE mmol/L 101   CO2 mmol/L 24   BUN mg/dL 13   CREATININE mg/dL 0.90   CALCIUM mg/dL 9.4   PROTEIN TOTAL g/dL 8.0*   BILIRUBIN TOTAL mg/dL 0.4   ALK PHOS U/L 133*   ALT U/L 17   AST U/L 16   GLUCOSE mg/dL 117*     Results from last 7 days   Lab Units 03/11/24 1912   BILIRUBIN TOTAL mg/dL 0.4             I have reviewed all laboratory and imaging results ordered/pertinent for this encounter.

## 2024-03-13 LAB
ALBUMIN SERPL BCP-MCNC: 3.2 G/DL (ref 3.4–5)
ALP SERPL-CCNC: 89 U/L (ref 33–110)
ALT SERPL W P-5'-P-CCNC: 10 U/L (ref 7–45)
ANION GAP SERPL CALC-SCNC: 11 MMOL/L (ref 10–20)
AST SERPL W P-5'-P-CCNC: 10 U/L (ref 9–39)
BILIRUB SERPL-MCNC: 0.4 MG/DL (ref 0–1.2)
BUN SERPL-MCNC: 11 MG/DL (ref 6–23)
CALCIUM SERPL-MCNC: 8.7 MG/DL (ref 8.6–10.6)
CHLORIDE SERPL-SCNC: 100 MMOL/L (ref 98–107)
CO2 SERPL-SCNC: 29 MMOL/L (ref 21–32)
CREAT SERPL-MCNC: 0.77 MG/DL (ref 0.5–1.05)
EGFRCR SERPLBLD CKD-EPI 2021: 90 ML/MIN/1.73M*2
GLUCOSE BLD MANUAL STRIP-MCNC: 144 MG/DL (ref 74–99)
GLUCOSE BLD MANUAL STRIP-MCNC: 179 MG/DL (ref 74–99)
GLUCOSE SERPL-MCNC: 96 MG/DL (ref 74–99)
POTASSIUM SERPL-SCNC: 4.2 MMOL/L (ref 3.5–5.3)
PROT SERPL-MCNC: 6.8 G/DL (ref 6.4–8.2)
SODIUM SERPL-SCNC: 136 MMOL/L (ref 136–145)
VANCOMYCIN TROUGH SERPL-MCNC: 11.3 UG/ML (ref 5–20)

## 2024-03-13 PROCEDURE — 80053 COMPREHEN METABOLIC PANEL: CPT | Performed by: EMERGENCY MEDICINE

## 2024-03-13 PROCEDURE — 80202 ASSAY OF VANCOMYCIN: CPT | Performed by: NURSE PRACTITIONER

## 2024-03-13 PROCEDURE — 82947 ASSAY GLUCOSE BLOOD QUANT: CPT

## 2024-03-13 PROCEDURE — 99233 SBSQ HOSP IP/OBS HIGH 50: CPT | Performed by: NURSE PRACTITIONER

## 2024-03-13 PROCEDURE — 2500000004 HC RX 250 GENERAL PHARMACY W/ HCPCS (ALT 636 FOR OP/ED): Performed by: NURSE PRACTITIONER

## 2024-03-13 PROCEDURE — 1100000001 HC PRIVATE ROOM DAILY

## 2024-03-13 PROCEDURE — 2500000004 HC RX 250 GENERAL PHARMACY W/ HCPCS (ALT 636 FOR OP/ED)

## 2024-03-13 PROCEDURE — 2500000001 HC RX 250 WO HCPCS SELF ADMINISTERED DRUGS (ALT 637 FOR MEDICARE OP): Performed by: NURSE PRACTITIONER

## 2024-03-13 PROCEDURE — 2500000001 HC RX 250 WO HCPCS SELF ADMINISTERED DRUGS (ALT 637 FOR MEDICARE OP)

## 2024-03-13 PROCEDURE — 36415 COLL VENOUS BLD VENIPUNCTURE: CPT | Performed by: STUDENT IN AN ORGANIZED HEALTH CARE EDUCATION/TRAINING PROGRAM

## 2024-03-13 PROCEDURE — 2500000004 HC RX 250 GENERAL PHARMACY W/ HCPCS (ALT 636 FOR OP/ED): Mod: JZ | Performed by: NURSE PRACTITIONER

## 2024-03-13 PROCEDURE — 36415 COLL VENOUS BLD VENIPUNCTURE: CPT | Performed by: NURSE PRACTITIONER

## 2024-03-13 RX ORDER — ENOXAPARIN SODIUM 100 MG/ML
40 INJECTION SUBCUTANEOUS EVERY 12 HOURS SCHEDULED
Status: DISCONTINUED | OUTPATIENT
Start: 2024-03-13 | End: 2024-03-28 | Stop reason: HOSPADM

## 2024-03-13 RX ORDER — ONDANSETRON HYDROCHLORIDE 2 MG/ML
4 INJECTION, SOLUTION INTRAVENOUS ONCE
Status: COMPLETED | OUTPATIENT
Start: 2024-03-13 | End: 2024-03-13

## 2024-03-13 RX ADMIN — VANCOMYCIN HYDROCHLORIDE 1250 MG: 1.25 INJECTION, POWDER, LYOPHILIZED, FOR SOLUTION INTRAVENOUS at 22:00

## 2024-03-13 RX ADMIN — ONDANSETRON 4 MG: 2 INJECTION INTRAMUSCULAR; INTRAVENOUS at 13:30

## 2024-03-13 RX ADMIN — MEROPENEM 1 G: 1 INJECTION, POWDER, FOR SOLUTION INTRAVENOUS at 09:25

## 2024-03-13 RX ADMIN — OXYCODONE HYDROCHLORIDE 10 MG: 5 TABLET ORAL at 23:59

## 2024-03-13 RX ADMIN — MEROPENEM 1 G: 1 INJECTION, POWDER, FOR SOLUTION INTRAVENOUS at 01:49

## 2024-03-13 RX ADMIN — OXYCODONE HYDROCHLORIDE 10 MG: 5 TABLET ORAL at 08:05

## 2024-03-13 RX ADMIN — CLINDAMYCIN IN 5 PERCENT DEXTROSE 600 MG: 12 INJECTION, SOLUTION INTRAVENOUS at 01:49

## 2024-03-13 RX ADMIN — ACETAMINOPHEN 975 MG: 325 TABLET ORAL at 13:30

## 2024-03-13 RX ADMIN — HYDROMORPHONE HYDROCHLORIDE 0.4 MG: 1 INJECTION, SOLUTION INTRAMUSCULAR; INTRAVENOUS; SUBCUTANEOUS at 13:30

## 2024-03-13 RX ADMIN — HYDROMORPHONE HYDROCHLORIDE 0.4 MG: 1 INJECTION, SOLUTION INTRAMUSCULAR; INTRAVENOUS; SUBCUTANEOUS at 09:30

## 2024-03-13 RX ADMIN — ACETAMINOPHEN 975 MG: 325 TABLET ORAL at 21:07

## 2024-03-13 RX ADMIN — OXYCODONE HYDROCHLORIDE 10 MG: 5 TABLET ORAL at 12:06

## 2024-03-13 RX ADMIN — ACETAMINOPHEN 975 MG: 325 TABLET ORAL at 05:02

## 2024-03-13 RX ADMIN — OXYCODONE HYDROCHLORIDE 10 MG: 5 TABLET ORAL at 18:00

## 2024-03-13 RX ADMIN — MEROPENEM 1 G: 1 INJECTION, POWDER, FOR SOLUTION INTRAVENOUS at 21:03

## 2024-03-13 RX ADMIN — CLINDAMYCIN IN 5 PERCENT DEXTROSE 600 MG: 12 INJECTION, SOLUTION INTRAVENOUS at 18:09

## 2024-03-13 RX ADMIN — CLINDAMYCIN IN 5 PERCENT DEXTROSE 600 MG: 12 INJECTION, SOLUTION INTRAVENOUS at 11:03

## 2024-03-13 RX ADMIN — HYDROMORPHONE HYDROCHLORIDE 0.4 MG: 1 INJECTION, SOLUTION INTRAMUSCULAR; INTRAVENOUS; SUBCUTANEOUS at 19:48

## 2024-03-13 RX ADMIN — VANCOMYCIN HYDROCHLORIDE 750 MG: 750 INJECTION, SOLUTION INTRAVENOUS at 10:13

## 2024-03-13 ASSESSMENT — PAIN SCALES - GENERAL
PAINLEVEL_OUTOF10: 10 - WORST POSSIBLE PAIN
PAINLEVEL_OUTOF10: 10 - WORST POSSIBLE PAIN
PAINLEVEL_OUTOF10: 9
PAINLEVEL_OUTOF10: 7
PAINLEVEL_OUTOF10: 0 - NO PAIN
PAINLEVEL_OUTOF10: 0 - NO PAIN
PAINLEVEL_OUTOF10: 9
PAINLEVEL_OUTOF10: 9

## 2024-03-13 ASSESSMENT — PAIN DESCRIPTION - ORIENTATION
ORIENTATION: RIGHT
ORIENTATION: RIGHT

## 2024-03-13 ASSESSMENT — PAIN - FUNCTIONAL ASSESSMENT
PAIN_FUNCTIONAL_ASSESSMENT: 0-10
PAIN_FUNCTIONAL_ASSESSMENT: UNABLE TO SELF-REPORT
PAIN_FUNCTIONAL_ASSESSMENT: 0-10

## 2024-03-13 ASSESSMENT — COGNITIVE AND FUNCTIONAL STATUS - GENERAL
DRESSING REGULAR LOWER BODY CLOTHING: A LITTLE
TOILETING: A LITTLE
MOBILITY SCORE: 24
DAILY ACTIVITIY SCORE: 21
HELP NEEDED FOR BATHING: A LITTLE

## 2024-03-13 ASSESSMENT — PAIN DESCRIPTION - LOCATION
LOCATION: GROIN
LOCATION: GROIN

## 2024-03-13 NOTE — SIGNIFICANT EVENT
"Attempted to draw labs, pt stated \"you get one shot\". Unsuccessful at using butterfly needle with vacutainer. Pt refuses another attempt. Informed that she is in the ICU and daily labs are necessary to monitor her recovery, but pt continues to refuse.   "

## 2024-03-13 NOTE — SIGNIFICANT EVENT
16G PIV very painful when flushed, itching at insertion  site, redness and swelling to upper forearm improving with discontinuation of infusion and application of ice. PIV removed. Remaining 20g PIV R FA flushing without pain, per MD ok to use this IV for infusion as pt does not want to have another IV placed. LR resumed at 100. Pt tolerating well without pain.

## 2024-03-13 NOTE — PROGRESS NOTES
"Vancomycin Dosing by Pharmacy- FOLLOW UP    Susan Farfan is a 58 y.o. year old female who Pharmacy has been consulted for vancomycin dosing for cellulitis, skin and soft tissue. Based on the patient's indication and renal status this patient is being dosed based on a goal AUC of 400-600.     Renal function is currently stable.    Current vancomycin dose: 750 mg given every 12 hours    Estimated vancomycin AUC on current dose: 282 mg/L.hr     Visit Vitals  /63   Pulse 69   Temp 36.2 °C (97.2 °F) (Temporal)   Resp 15        Lab Results   Component Value Date    CREATININE 0.77 03/13/2024    CREATININE 0.90 03/11/2024    CREATININE 0.78 08/04/2022        Patient weight is No results found for: \"PTWEIGHT\"    No results found for: \"CULTURE\"     I/O last 3 completed shifts:  In: 3723 [P.O.:500; I.V.:2573; IV Piggyback:650]  Out: 2500 [Urine:2500]  [unfilled]    No results found for: \"PATIENTTEMP\"     Assessment/Plan    Below goal AUC. Orders placed for new vancomcyin regimen of 1250 mg every 12 hours to begin at 3/13 2200.     This dosing regimen is predicted by [employer] to result in the following pharmacokinetic parameters:  Loading dose: N/A  Regimen: 1250 mg IV every 12 hours.  Start time: 22:13 on 03/13/2024  Exposure target: AUC24 (range)400-600 mg/L.hr   AUC24,ss: 469 mg/L.hr  Probability of AUC24 > 400: 92 %  Ctrough,ss: 11.2 mg/L  Probability of Ctrough,ss > 20: 1 %  Probability of nephrotoxicity (Lodise VERONICA 2009): 7 %      The next level will be obtained on 3/14 with AM labs. May be obtained sooner if clinically indicated.   Will continue to monitor renal function daily while on vancomycin and order serum creatinine at least every 48 hours if not already ordered.  Follow for continued vancomycin needs, clinical response, and signs/symptoms of toxicity.       Telma Meade, [facility]           "

## 2024-03-13 NOTE — PROGRESS NOTES
Labs that were collected at 0418 today (3/13) never made it to the lab. Patient was originally refusing for vancomycin redraw, per RN, however she ended up allowing him to draw the lab this afternoon. A CMP was added on to the vancomycin level that was drawn, since it was also one of the labs that did not make it down to the lab this morning either. CMP needed for renal labs to correctly redose vancomycin.

## 2024-03-13 NOTE — PROGRESS NOTES
[facility]  ACUTE CARE SURGERY - PROGRESS NOTE    Patient Name: Susan Farfan  MRN: [id number]  Admit Date: 311  : 1965  AGE: 58 y.o.   GENDER: female  ==============================================================================  TODAY'S ASSESSMENT AND PLAN OF CARE:    Susan Farfan is a 58F PMHx smoking, BG elevations, presents as a transfer for NSTI of perineum s/p debridement (3/12/24). Patient was brought to ICU for desaturation, improving currently 6L on Venti-mask, otherwise hemodynamically stable. HgbA1C was 7.0. Patient currently refusing dressing change and all other nursing care. Plan for transfer to [facility] today     Continue WTD BID dressing changes as patient is willing, no longer will do sedation for dressing changes   Wean O2 as tolerated  Continue blood glucose checks with SSI if patient agreeable   NSTI: Continue abx: Vanco, Meropenem for 4 days after source control. Stop Clindamycin after 48 hours (end date 3/14)   Follow up Bcx and Intra-op Cx 3/12    Dispo: Transfer to [facility]     [facility]  [facility], Pager [phone]    ==============================================================================  CHIEF COMPLAINT / EVENTS LAST 24HRS / HPI:  No acute events overnight, patient refusing dressing change unless she can have ketamine.     MEDICAL HISTORY / ROS:   Admission history and ROS reviewed. Pertinent changes as follows:  None    PHYSICAL EXAM:  Heart Rate:  [61-83]   Temp:  [35.9 °C (96.6 °F)-36.2 °C (97.2 °F)]   Resp:  [7-22]   BP: ()/()   SpO2:  [89 %-98 %]     Physical Exam:    Constitutional - sitting in bed, NAD  Cards- RRR on tele  Resp- nonlabored breathing on 4L NC  Extremities- NEWTON   Skin- warm, dry   : cohn in place with yellow urine   Neuro- alert and oriented x3   Psych- appropriate mood   Wound assessment - deferred    LABS:  Results for orders placed or performed during the hospital encounter of 24  (from the past 24 hour(s))   POCT GLUCOSE   Result Value Ref Range    POCT Glucose 124 (H) 74 - 99 mg/dL   POCT GLUCOSE   Result Value Ref Range    POCT Glucose 145 (H) 74 - 99 mg/dL   POCT GLUCOSE   Result Value Ref Range    POCT Glucose 179 (H) 74 - 99 mg/dL     MEDICATIONS:  Current Facility-Administered Medications   Medication Dose Route Frequency Provider Last Rate Last Admin    acetaminophen (Tylenol) tablet 975 mg  975 mg oral q8h Chaitanya Izaguirre APRN-CNP   975 mg at 03/13/24 1330    clindamycin (Cleocin) 600 mg in dextrose 5% water 50 mL  600 mg intravenous q8h Chaitanya Izaguirre APRN-CNP   Stopped at 03/13/24 1133    dextrose 10 % in water (D10W) infusion  0.3 g/kg/hr intravenous Once PRN JOSELUIS Veronica-CNP        dextrose 50 % injection 25 g  25 g intravenous q15 min PRN TRENTON Veronica        enoxaparin (Lovenox) syringe 40 mg  40 mg subcutaneous q12h WILLIAN JOSELUIS Veronica-CNP        glucagon (Glucagen) injection 1 mg  1 mg intramuscular q15 min PRN JOSELUIS Veronica-BRISEYDA        HYDROmorphone (Dilaudid) injection 0.4 mg  0.4 mg intravenous q4h PRN Chaitanya Izaguirre APRN-CNP   0.4 mg at 03/13/24 1330    insulin lispro (HumaLOG) injection 0-10 Units  0-10 Units subcutaneous q4h JOSELUIS Veronica-CNP        meropenem (Merrem) 1 g in sodium chloride 0.9 % 100 mL IV  1 g intravenous q8h Chaitanya Izaguirre APRN-CNP   Stopped at 03/13/24 0955    naloxone (Narcan) injection 0.2 mg  0.2 mg intravenous q5 min PRTRENTON Fowler        oxyCODONE (Roxicodone) immediate release tablet 10 mg  10 mg oral q4h PRN Chaitanya Izaguirre APRN-CNP   10 mg at 03/13/24 1206    oxyCODONE (Roxicodone) immediate release tablet 5 mg  5 mg oral q4h PRN TRENTON Veronica        oxygen (O2) therapy   inhalation Continuous PRN - O2/gases TRENTON Veronica        polyethylene glycol (Glycolax, Miralax) packet 17 g  17 g oral Daily TRENTON Veronica        sennosides-docusate sodium  (Nicolle-Colace) 8.6-50 mg per tablet 2 tablet  2 tablet oral BID TRENTON Veronica        vancomycin (Vancocin) placeholder   miscellaneous Daily PRN TRENTON Veronica        vancomycin in dextrose 5 % (Vancocin) IVPB 750 mg  750 mg intravenous q12h TRENTON Veronica   Stopped at 03/13/24 1058       IMAGING SUMMARY:  (summary of new imaging findings, not a copy of dictation)    I have reviewed all laboratory and imaging results ordered/pertinent for today's encounter.

## 2024-03-13 NOTE — SIGNIFICANT EVENT
KVO and antibiotics running in 16g RFA, pt c/o itching, noted redness and swelling proximal to site below AC. Infusion stopped. Vanc had finished infusing. Pt states this has happened before with administration of vanco. Ice applied to site and IV flushed with NS

## 2024-03-13 NOTE — PROGRESS NOTES
Communication Note    Patient Name: Susan Farfan  MRN: [id number]  Today's Date: 3/13/2024     Discipline: Physical Therapy      Missed Visit: Yes  Missed Visit Reason:  (PT evaluation attempted, patient declining any intervention and further follow up.  Expressing no functional concerns except pain and states she can independently mobilize.  PT to sign off with no further needs.)      03/13/24 at 1:39 PM   Juan Jose Bhakta, PT   Rehab Office: [phone]

## 2024-03-13 NOTE — SIGNIFICANT EVENT
"Pt continues to decline insulin, bowel regimen, wants no further blood sugar testing this shift. She declines all turning, boosting, repositioning, bathing or care. She declines dressing change with premedication with Versed and oxy or dilaudid as ordered, will only consider with Ketamine or sedation. Team aware. Pt educated on importance of positioning to prevent skin breakdown, and importance of dressing change to promote wound healing and prevent infection. Pt verbalizes understanding but continues to decline. Pt states she does not have diabetes and insulin is \"a conspiracy by the government to get people to take more medications.\" Education provided on importance of monitoring for and treatment of hypoglycemia and hyperglycemia, pt continues to refuse.   "

## 2024-03-13 NOTE — PROGRESS NOTES
[facility]  TRAUMA ICU - PROGRESS NOTE    Patient Name: Susan Farfan  MRN: [id number]  Admit Date: 311  : 1965  AGE: 58 y.o.   GENDER: female  ==============================================================================  59 yo F with right groin NSTI.       Problems:   Necrotizing soft tissue infection of the groin  Hyperglycemia  Hx. Pt. Denies any past medical history    ==============================================================================  TODAY'S ASSESSMENT AND PLAN OF CARE:  Susan Farfan is a 58 y.o. female in the ICU due to: will require conscious sedation vs. Pain medication for dressing change today. Can go to floor after.     Neuro: Aox3. Pain control scheduled tylenol, oxy 5/10 as needed, dilaudid breakthrough.      Resp: On 4L nc. Pt. Reports smoking 15 cigarettes a day since 6th grade. Maintain spo2 greater than 88%. IS.     Cardiac: HDS. No pressors. No active issues.     GI: Diabetic diet. BR. LR 100ml/hr, dc'd today.      /FEN: Petersen in place for strict I/Os and to protect surgical debridement of groin from urine. Monitor electrolytes and replete as indicated.     Heme: No active issues.      Endo: Hyperglycemia, no DM diagnosis. A1C 7. SSI ordered. Pt. Has been refusing insulin coverage, stated she dos not have diabetes.     ID: Necrotizing soft tissue infection of the groin. Started overnight Vanco, cefepime, clindamycin. Blood cultures x2 obtained. Culture of surgical wound obtained in OR.   ATB Vanco, meropenem for 4 days after source control. Clinda for 48 hours (stop tomorrow 3/14).      Musk/Skin: PT/OT as able. Pending return to OR plan with ACS. ACS to change dressing under conscious sedation vs. Pain medication today, will determine then if more OR is needed. BID dressing changes AM ACS, PM nursing.      Proph: LVX, scds.      Lines: Petersen, PIV     Social: smokes 15 cigarettes a day since 6th grade.      Dispo: Possible floor this  afternoon if dressing change can be done with pain medication only.     Update 1111am: Pt. Refusing DVT proph LVX and SCDs, as well as insulin. Pt. States she does need them and will not be taking the insulin or lvx. Does not want to wear scds. Pt. Has been educated on benefits vs. Risks of refusing. Pt. Continues to refuse.     Update 1303pm: Pt. Is refusing dressing changes unless she can have ketamine. No ketamine will be given d/t that not being a long term plan for dressing changes. Pt. Refused IV pain medication for dressing changes. Pt. Refusing to eat or take bowel regimen d/t not wanting to have a bowel movement. Continuing to refuse insulin and lvx. Pt. Was explained risks vs. Benefits of refusing medical care and treatments. Pt. Verbalized understanding and continued to refuse.      JOSELUIS [facility]  Trauma Surgery   [id number]     Total face to face time spent with patient/family of 35 minutes critical care time, with >50% of the time spent discussing plan of care/management, counseling/educating on disease processes, explaining results of diagnostic testing.      ==============================================================================  CHIEF COMPLAINT / OVERNIGHT EVENTS / HPI:   No acute events overnight.     MEDICAL HISTORY / ROS:  Admission history and ROS reviewed. Pertinent changes as follows:  Pain controlled this am. No new complaints.     PHYSICAL EXAM:  Heart Rate:  []   Temp:  [35.6 °C (96.1 °F)-36.3 °C (97.3 °F)]   Resp:  [7-19]   BP: ()/()   SpO2:  [92 %-97 %]   Physical Exam  Vitals reviewed.   HENT:      Head: Normocephalic and atraumatic.      Right Ear: External ear normal.      Left Ear: External ear normal.      Nose: Nose normal.      Mouth/Throat:      Mouth: Mucous membranes are moist.      Pharynx: Oropharynx is clear.   Eyes:      Pupils: Pupils are equal, round, and reactive to light.   Cardiovascular:      Rate and Rhythm: Normal rate.      Pulses:  Normal pulses.   Pulmonary:      Comments: Respirations even and unlabored, thorax symmetric, on 4L nc saturation 95%   Abdominal:      Comments: Obese abd, soft, nontender, nondistended    Genitourinary:     Comments: Petersen in place draining clear yellow urine   Musculoskeletal:         General: No swelling.      Cervical back: Neck supple.      Right lower leg: No edema.      Left lower leg: No edema.   Skin:     General: Skin is warm.      Capillary Refill: Capillary refill takes less than 2 seconds.      Comments: Right groin NSTI with debridement, dressing in place   Neurological:      Mental Status: She is alert and oriented to person, place, and time.   Psychiatric:         Mood and Affect: Mood normal.         Behavior: Behavior normal.             LABS:  Results from last 7 days   Lab Units 03/12/24 0533 03/11/24 1912   WBC AUTO x10*3/uL 13.1* 12.3*   HEMOGLOBIN g/dL 13.3 14.6   HEMATOCRIT % 42.8 43.4   PLATELETS AUTO x10*3/uL 277 303   NEUTROS PCT AUTO % 92.4 73.8   LYMPHS PCT AUTO % 5.3 16.4   MONOS PCT AUTO % 1.4 8.5   EOS PCT AUTO % 0.1 0.7     Results from last 7 days   Lab Units 03/12/24  0533   APTT seconds 23*   INR  1.1     Results from last 7 days   Lab Units 03/11/24 1912   SODIUM mmol/L 137   POTASSIUM mmol/L 4.7   CHLORIDE mmol/L 101   CO2 mmol/L 24   BUN mg/dL 13   CREATININE mg/dL 0.90   CALCIUM mg/dL 9.4   PROTEIN TOTAL g/dL 8.0*   BILIRUBIN TOTAL mg/dL 0.4   ALK PHOS U/L 133*   ALT U/L 17   AST U/L 16   GLUCOSE mg/dL 117*     Results from last 7 days   Lab Units 03/11/24 1912   BILIRUBIN TOTAL mg/dL 0.4         I have reviewed all medications, laboratory results, and imaging pertinent for today's encounter.

## 2024-03-14 ENCOUNTER — HOME HEALTH ADMISSION (OUTPATIENT)
Dept: HOME HEALTH SERVICES | Facility: HOME HEALTH | Age: 59
End: 2024-03-14
Payer: MEDICAID

## 2024-03-14 LAB
ALBUMIN SERPL BCP-MCNC: 3.2 G/DL (ref 3.4–5)
ANION GAP SERPL CALC-SCNC: 12 MMOL/L (ref 10–20)
B-LACTAMASE ORGANISM ISLT: POSITIVE
BACTERIA SPEC CULT: NORMAL
BACTERIA SPEC CULT: NORMAL
BUN SERPL-MCNC: 14 MG/DL (ref 6–23)
CALCIUM SERPL-MCNC: 8.8 MG/DL (ref 8.6–10.6)
CHLORIDE SERPL-SCNC: 101 MMOL/L (ref 98–107)
CO2 SERPL-SCNC: 29 MMOL/L (ref 21–32)
CREAT SERPL-MCNC: 0.78 MG/DL (ref 0.5–1.05)
EGFRCR SERPLBLD CKD-EPI 2021: 88 ML/MIN/1.73M*2
ERYTHROCYTE [DISTWIDTH] IN BLOOD BY AUTOMATED COUNT: 13.7 % (ref 11.5–14.5)
GLUCOSE BLD MANUAL STRIP-MCNC: 117 MG/DL (ref 74–99)
GLUCOSE BLD MANUAL STRIP-MCNC: 134 MG/DL (ref 74–99)
GLUCOSE BLD MANUAL STRIP-MCNC: 154 MG/DL (ref 74–99)
GLUCOSE SERPL-MCNC: 119 MG/DL (ref 74–99)
GRAM STN SPEC: NORMAL
GRAM STN SPEC: NORMAL
HCT VFR BLD AUTO: 38.5 % (ref 36–46)
HGB BLD-MCNC: 12.8 G/DL (ref 12–16)
MAGNESIUM SERPL-MCNC: 1.81 MG/DL (ref 1.6–2.4)
MCH RBC QN AUTO: 32.6 PG (ref 26–34)
MCHC RBC AUTO-ENTMCNC: 33.2 G/DL (ref 32–36)
MCV RBC AUTO: 98 FL (ref 80–100)
NRBC BLD-RTO: 0 /100 WBCS (ref 0–0)
PHOSPHATE SERPL-MCNC: 3.8 MG/DL (ref 2.5–4.9)
PLATELET # BLD AUTO: 260 X10*3/UL (ref 150–450)
POTASSIUM SERPL-SCNC: 4.3 MMOL/L (ref 3.5–5.3)
RBC # BLD AUTO: 3.93 X10*6/UL (ref 4–5.2)
SODIUM SERPL-SCNC: 138 MMOL/L (ref 136–145)
VANCOMYCIN SERPL-MCNC: 11.4 UG/ML (ref 5–20)
WBC # BLD AUTO: 8.1 X10*3/UL (ref 4.4–11.3)

## 2024-03-14 PROCEDURE — 2500000004 HC RX 250 GENERAL PHARMACY W/ HCPCS (ALT 636 FOR OP/ED): Mod: JZ | Performed by: NURSE PRACTITIONER

## 2024-03-14 PROCEDURE — 2500000004 HC RX 250 GENERAL PHARMACY W/ HCPCS (ALT 636 FOR OP/ED): Performed by: NURSE PRACTITIONER

## 2024-03-14 PROCEDURE — 99231 SBSQ HOSP IP/OBS SF/LOW 25: CPT | Performed by: PHYSICIAN ASSISTANT

## 2024-03-14 PROCEDURE — 1100000001 HC PRIVATE ROOM DAILY

## 2024-03-14 PROCEDURE — 2500000001 HC RX 250 WO HCPCS SELF ADMINISTERED DRUGS (ALT 637 FOR MEDICARE OP): Performed by: NURSE PRACTITIONER

## 2024-03-14 PROCEDURE — 85027 COMPLETE CBC AUTOMATED: CPT | Performed by: NURSE PRACTITIONER

## 2024-03-14 PROCEDURE — 80202 ASSAY OF VANCOMYCIN: CPT

## 2024-03-14 PROCEDURE — A4217 STERILE WATER/SALINE, 500 ML: HCPCS

## 2024-03-14 PROCEDURE — 2500000004 HC RX 250 GENERAL PHARMACY W/ HCPCS (ALT 636 FOR OP/ED): Performed by: PHYSICIAN ASSISTANT

## 2024-03-14 PROCEDURE — 36415 COLL VENOUS BLD VENIPUNCTURE: CPT | Performed by: NURSE PRACTITIONER

## 2024-03-14 PROCEDURE — 82947 ASSAY GLUCOSE BLOOD QUANT: CPT

## 2024-03-14 PROCEDURE — 80069 RENAL FUNCTION PANEL: CPT | Performed by: NURSE PRACTITIONER

## 2024-03-14 PROCEDURE — 2500000004 HC RX 250 GENERAL PHARMACY W/ HCPCS (ALT 636 FOR OP/ED)

## 2024-03-14 PROCEDURE — 83735 ASSAY OF MAGNESIUM: CPT | Performed by: NURSE PRACTITIONER

## 2024-03-14 RX ORDER — HYDROMORPHONE HCL/0.9% NACL/PF 15 MG/30ML
PATIENT CONTROLLED ANALGESIA SYRINGE INTRAVENOUS CONTINUOUS
Status: DISCONTINUED | OUTPATIENT
Start: 2024-03-14 | End: 2024-03-19

## 2024-03-14 RX ORDER — ONDANSETRON 4 MG/1
4 TABLET, ORALLY DISINTEGRATING ORAL EVERY 8 HOURS PRN
Status: DISCONTINUED | OUTPATIENT
Start: 2024-03-14 | End: 2024-03-20

## 2024-03-14 RX ORDER — ONDANSETRON HYDROCHLORIDE 2 MG/ML
4 INJECTION, SOLUTION INTRAVENOUS EVERY 8 HOURS PRN
Status: DISCONTINUED | OUTPATIENT
Start: 2024-03-14 | End: 2024-03-20

## 2024-03-14 RX ORDER — NALOXONE HYDROCHLORIDE 0.4 MG/ML
0.2 INJECTION, SOLUTION INTRAMUSCULAR; INTRAVENOUS; SUBCUTANEOUS AS NEEDED
Status: DISCONTINUED | OUTPATIENT
Start: 2024-03-14 | End: 2024-03-28 | Stop reason: HOSPADM

## 2024-03-14 RX ORDER — MAGNESIUM SULFATE HEPTAHYDRATE 40 MG/ML
2 INJECTION, SOLUTION INTRAVENOUS ONCE
Status: COMPLETED | OUTPATIENT
Start: 2024-03-14 | End: 2024-03-14

## 2024-03-14 RX ORDER — HYDROMORPHONE HYDROCHLORIDE 1 MG/ML
0.5 INJECTION, SOLUTION INTRAMUSCULAR; INTRAVENOUS; SUBCUTANEOUS ONCE
Status: COMPLETED | OUTPATIENT
Start: 2024-03-14 | End: 2024-03-14

## 2024-03-14 RX ADMIN — VANCOMYCIN HYDROCHLORIDE 1250 MG: 1.25 INJECTION, POWDER, LYOPHILIZED, FOR SOLUTION INTRAVENOUS at 22:00

## 2024-03-14 RX ADMIN — ACETAMINOPHEN 975 MG: 325 TABLET ORAL at 05:28

## 2024-03-14 RX ADMIN — Medication: at 11:12

## 2024-03-14 RX ADMIN — OXYCODONE HYDROCHLORIDE 10 MG: 5 TABLET ORAL at 12:53

## 2024-03-14 RX ADMIN — CLINDAMYCIN IN 5 PERCENT DEXTROSE 600 MG: 12 INJECTION, SOLUTION INTRAVENOUS at 09:53

## 2024-03-14 RX ADMIN — HYDROMORPHONE HYDROCHLORIDE 0.5 MG: 1 INJECTION, SOLUTION INTRAMUSCULAR; INTRAVENOUS; SUBCUTANEOUS at 06:20

## 2024-03-14 RX ADMIN — CLINDAMYCIN IN 5 PERCENT DEXTROSE 600 MG: 12 INJECTION, SOLUTION INTRAVENOUS at 02:21

## 2024-03-14 RX ADMIN — CLINDAMYCIN IN 5 PERCENT DEXTROSE 600 MG: 12 INJECTION, SOLUTION INTRAVENOUS at 17:44

## 2024-03-14 RX ADMIN — MEROPENEM 1 G: 1 INJECTION, POWDER, FOR SOLUTION INTRAVENOUS at 20:29

## 2024-03-14 RX ADMIN — ACETAMINOPHEN 975 MG: 325 TABLET ORAL at 12:53

## 2024-03-14 RX ADMIN — MAGNESIUM SULFATE HEPTAHYDRATE 2 G: 40 INJECTION, SOLUTION INTRAVENOUS at 09:53

## 2024-03-14 RX ADMIN — MEROPENEM 1 G: 1 INJECTION, POWDER, FOR SOLUTION INTRAVENOUS at 04:24

## 2024-03-14 RX ADMIN — VANCOMYCIN HYDROCHLORIDE 1250 MG: 1.25 INJECTION, POWDER, LYOPHILIZED, FOR SOLUTION INTRAVENOUS at 10:00

## 2024-03-14 RX ADMIN — OXYCODONE HYDROCHLORIDE 10 MG: 5 TABLET ORAL at 07:47

## 2024-03-14 RX ADMIN — ACETAMINOPHEN 975 MG: 325 TABLET ORAL at 22:11

## 2024-03-14 RX ADMIN — HYDROMORPHONE HYDROCHLORIDE 0.4 MG: 1 INJECTION, SOLUTION INTRAMUSCULAR; INTRAVENOUS; SUBCUTANEOUS at 04:32

## 2024-03-14 RX ADMIN — OXYCODONE HYDROCHLORIDE 10 MG: 5 TABLET ORAL at 17:44

## 2024-03-14 RX ADMIN — MEROPENEM 1 G: 1 INJECTION, POWDER, FOR SOLUTION INTRAVENOUS at 12:58

## 2024-03-14 ASSESSMENT — PAIN SCALES - GENERAL
PAINLEVEL_OUTOF10: 7
PAINLEVEL_OUTOF10: 10 - WORST POSSIBLE PAIN
PAINLEVEL_OUTOF10: 10 - WORST POSSIBLE PAIN
PAINLEVEL_OUTOF10: 7
PAINLEVEL_OUTOF10: 7
PAINLEVEL_OUTOF10: 0 - NO PAIN
PAINLEVEL_OUTOF10: 10 - WORST POSSIBLE PAIN

## 2024-03-14 ASSESSMENT — PAIN DESCRIPTION - ORIENTATION
ORIENTATION: RIGHT
ORIENTATION: RIGHT

## 2024-03-14 ASSESSMENT — PAIN - FUNCTIONAL ASSESSMENT
PAIN_FUNCTIONAL_ASSESSMENT: 0-10
PAIN_FUNCTIONAL_ASSESSMENT: WONG-BAKER FACES
PAIN_FUNCTIONAL_ASSESSMENT: 0-10

## 2024-03-14 ASSESSMENT — PAIN DESCRIPTION - LOCATION
LOCATION: GROIN
LOCATION: GROIN

## 2024-03-14 ASSESSMENT — COGNITIVE AND FUNCTIONAL STATUS - GENERAL
DAILY ACTIVITIY SCORE: 24
MOBILITY SCORE: 24

## 2024-03-14 ASSESSMENT — PAIN SCALES - WONG BAKER: WONGBAKER_NUMERICALRESPONSE: NO HURT

## 2024-03-14 NOTE — PROGRESS NOTES
Susan Farfan is a 58 y.o. female on day 2 of admission presenting with Necrotizing soft tissue infection.      DC Planning:  3/14:   Went in and met with the pt, confirmed demographics.   Pt lives at home with brother Earle ([phone]). Pt is concerned about paying her bills if she goes to a snf. I will reach out to  for assistance.   Sent email to [facility] about pts financial needs.   Also gave pt list of snfs for her to look at.   [facility] can not accept pt. OON insurance.     3/18:  Pt has not chosen whether she would like to go home with home care or to a snf. I reminded pt that she needs to be seen by PT/OT for recommendations for either service. She agreed to see them today. Will follow up after PT/OT recs in.   Pt would like to do out patient wound care. I found a clinic for her, close to her house.   [facility]. [address] [facility] [address]. ([phone])  Appt made for Friday 3/22 @ 9:45am.     3/19:  Talked to pt about plan for dc. Pt wants to do wound care out pt with [facility]. Appt set for 2/22 @ 9:45am. Home wound vac will be delivered to the room and put on before dc tomorrow 2/20.     3/22:  Pt waiting for wound vac. Everything has been submitted, waiting for auth. Escalated. [facility] Management # [phone], this is the number I had to call for escalation, You leave a voice message they call back pretty proptly.   Going home doing out patient wound care at [facility], [address] [facility] [address]. ([phone]). Appt set for Monday 3/25 @ 3:15pm.     3/27:   Appeal being done for wound Vac.   Wound Vac approved. Will be delivered tomorrow.     3/28:  Wound Vac delivered.   Pt going home today.   Appt set for wound care 4/1 @ 8:45am with [facility]. [address] [facility] [address]. ([phone]).   Ride set up through insurance. Set for 1pm      It was explained to the pt that she can not drive on pain medications (narcotics) and she would need to find means  of transportation to get to her out pt wound care appts. It was also explained that the wound care can be a very painful experience and she is advised that pain medications would be in her best interest.      PCP: Brianna  Last Seen: inpatient here     ADOD: 3/20    This TCC will continue to follow for home going needs and safe DC plan.       RANDEE MCLEOD

## 2024-03-14 NOTE — CONSULTS
Wound Care Consult     Visit Date: 3/14/2024      Patient Name: Susan Farfan         MRN: [id number]           YOB: 1965     Reason for Consult: wound vac application       Wound Team Summary Assessment:   Wound location: right groin   size: 20cm x3cm x 6cm                           undermining:  none      tracking: none                                       Wound type:  surgical, s/p debridement  Wound bed:  clean  Draining: serosanguinous  Periwound skin: slightly moist/macerated     Wound Team Plan:   Wound Vac applied today, patient with PCA pump for pain control.    (0)  Mepitel   (1)  black foam  Pressure:125mmhg continuous suction  Plan:  Change wound Vac 2x/week (mon/thurs).   If patient is discharged prior to next dressing change, please disconnect VAC machine, remove foam dressing, and place machine in the soiled utility room on the unit.    Pack wound with wet-to moist NS kerlix and cover with a dry sterile dressing. Patient cannot leave hospital with VAC in place.   Veronica Ambriz RN  3/14/2024  4:19 PM

## 2024-03-14 NOTE — PROGRESS NOTES
[facility]  ACUTE CARE SURGERY - PROGRESS NOTE    Patient Name: Susan Farfan  MRN: [id number]  Admit Date: 311  : 1965  AGE: 58 y.o.   GENDER: female  ==============================================================================  TODAY'S ASSESSMENT AND PLAN OF CARE:  Susan Farfan is a 58F PMHx smoking, BG elevations, presents as a transfer for NSTI of perineum s/p debridement (3/12/24). Patient was brought to ICU for desaturation, improving currently 6L on Venti-mask, otherwise hemodynamically stable. HgbA1C was 7.0.     Plan:      NEURO/PAIN:  -PO tylenol  -patient upset about not receiving ketamine prior to dressing changes, pre medicated dressing change this AM with dilaudid  -started PCA dilaudid  -wean and transition to PO pain medications over the next few days    RESPIRATORY:  patient not agreeable to wearing O2  -lowered O2 requirements to 86% due to patient chronic smoking status  -wean O2  -IS     GI: HgbA1C 7  Patient upset about her carb controlled diet, after lengthy discussion with Dr Cosme and patient, patient to be placed on regular diet, POCT glucose checks TID AC   -regular diet  -before meals POCT glucose checks     :   -cohn to be dc today     MSK:   -OOB as tolerated  -PT/OT    HEMATOLOGIC:   -monitor H&H      ENDOCRINE:   -blood glucose checks changed in agreement with patient, to TID AC     INFECTIOUS DISEASE:   -NSTI: Continue abx: Vanco, Meropenem for 4 days after source control. Stop Clindamycin after 48 hours (end date 3/14)  -Follow up Bcx and Intra-op Cx 3/12    -WOCN for wound vac to be applied today, patient agreeable     Lines   -cohn- to be dc today  -Peripheral IV     DVT PROPHYLAXIS: lovenox , SCDs     Dispo: dc home with [facility] for wound VAC care vs SNF, PT/OT ordered, appreciate SW/care transitions for their help with dispo     Patient seen and discussed with Attending Dr Carmelina Aguilar PA-C  Acute Care Surgery              ==============================================================================  CHIEF COMPLAINT / EVENTS LAST 24HRS / HPI:  Patient offers no new complaints or concerns    MEDICAL HISTORY / ROS:   Admission history and ROS reviewed. Pertinent changes as follows:  none    PHYSICAL EXAM:  Heart Rate:  [66-77]   Temp:  [35.8 °C (96.4 °F)-36.7 °C (98.1 °F)]   Resp:  [17-18]   BP: (119-147)/(71-81)   SpO2:  [91 %-96 %]   Physical Exam  Constitutional - sitting in bed, NAD  Cards- RRR on tele  Resp- nonlabored breathing , has NC O2 at bedside, not wearing  Extremities- NEWTON   Skin- warm, dry   : cohn in place with yellow urine   Wound: dressing changed today, wound bed pink, red with some bloody drainage  Neuro- alert and oriented x3   Psych- appropriate mood       IMAGING SUMMARY:  (summary of new imaging findings, not a copy of dictation)  none    LABS:  Results from last 7 days   Lab Units 03/14/24  0630 03/12/24  0533 03/11/24  1912   WBC AUTO x10*3/uL 8.1 13.1* 12.3*   HEMOGLOBIN g/dL 12.8 13.3 14.6   HEMATOCRIT % 38.5 42.8 43.4   PLATELETS AUTO x10*3/uL 260 277 303   NEUTROS PCT AUTO %  --  92.4 73.8   LYMPHS PCT AUTO %  --  5.3 16.4   MONOS PCT AUTO %  --  1.4 8.5   EOS PCT AUTO %  --  0.1 0.7     Results from last 7 days   Lab Units 03/12/24  0533   APTT seconds 23*   INR  1.1     Results from last 7 days   Lab Units 03/14/24  0629 03/13/24  1632 03/11/24  1912   SODIUM mmol/L 138 136 137   POTASSIUM mmol/L 4.3 4.2 4.7   CHLORIDE mmol/L 101 100 101   CO2 mmol/L 29 29 24   BUN mg/dL 14 11 13   CREATININE mg/dL 0.78 0.77 0.90   CALCIUM mg/dL 8.8 8.7 9.4   PROTEIN TOTAL g/dL  --  6.8 8.0*   BILIRUBIN TOTAL mg/dL  --  0.4 0.4   ALK PHOS U/L  --  89 133*   ALT U/L  --  10 17   AST U/L  --  10 16   GLUCOSE mg/dL 119* 96 117*     Results from last 7 days   Lab Units 03/13/24  1632 03/11/24  1912   BILIRUBIN TOTAL mg/dL 0.4 0.4           I have reviewed all medications, laboratory results, and imaging  pertinent for today's encounter.

## 2024-03-14 NOTE — ED PROCEDURE NOTE
Procedure  Critical Care    Performed by: Froy Osman MD  Authorized by: Froy Osman MD    Critical care provider statement:     Critical care time (minutes):  30    Critical care time was exclusive of:  Separately billable procedures and treating other patients    Critical care was necessary to treat or prevent imminent or life-threatening deterioration of the following conditions: Gas gangrene.    Critical care was time spent personally by me on the following activities:  Ordering and performing treatments and interventions, review of old charts, development of treatment plan with patient or surrogate, discussions with consultants, pulse oximetry, evaluation of patient's response to treatment, re-evaluation of patient's condition, interpretation of cardiac output measurements and obtaining history from patient or surrogate    Care discussed with: admitting provider                 Froy Osman MD  03/14/24 0153

## 2024-03-14 NOTE — PROGRESS NOTES
Physical Therapy    Therapy Communication Note    Patient Name: Susan Farfan  MRN: [id number]  Today's Date: 3/14/2024     Discipline: Physical Therapy      Missed Visit: Yes  Missed Visit Reason:  (Pt adamantly refused to participate in PT. Provided encouragement and explained the benefits of OOB, but pt argumentative and not receptive. Messaged team to inform them of pt's refusal.)      03/14/24 at 9:16 AM   Danielle Viera, PT

## 2024-03-14 NOTE — PROGRESS NOTES
"Vancomycin Dosing by Pharmacy- FOLLOW UP    Susan Farfan is a 58 y.o. year old female who Pharmacy has been consulted for vancomycin dosing for cellulitis, skin and soft tissue. Based on the patient's indication and renal status this patient is being dosed based on a goal AUC of 400-600.     Renal function is currently stable.    Current vancomycin dose: 1250 mg given every 12 hours    Most recent random level: 11.4 mcg/mL    Visit Vitals  /74 (BP Location: Left arm, Patient Position: Lying)   Pulse 67   Temp 36.2 °C (97.2 °F) (Temporal)   Resp 17        Lab Results   Component Value Date    CREATININE 0.78 03/14/2024    CREATININE 0.77 03/13/2024    CREATININE 0.90 03/11/2024    CREATININE 0.78 08/04/2022        Patient weight is No results found for: \"PTWEIGHT\"    No results found for: \"CULTURE\"     I/O last 3 completed shifts:  In: 3523 [P.O.:1200; I.V.:1373; IV Piggyback:950]  Out: 3605 [Urine:3605]  [unfilled]    No results found for: \"PATIENTTEMP\"     Assessment/Plan    Within goal AUC range. Continue current vancomycin regimen.    This dosing regimen is predicted by [employer] to result in the following pharmacokinetic parameters:  Loading dose: N/A  Regimen: 1250 mg IV every 12 hours.  Start time: 10:00 on 03/14/2024  Exposure target: AUC24 (range)400-600 mg/L.hr   AUC24,ss: 484 mg/L.hr  Probability of AUC24 > 400: 64 %  Ctrough,ss: 8.1 mg/L  Probability of Ctrough,ss > 20: 19 %  Probability of nephrotoxicity (Lodise VERONICA 2009): 5 %    The next level will be obtained on 3/18 at AM labs. May be obtained sooner if clinically indicated.   Will continue to monitor renal function daily while on vancomycin and order serum creatinine at least every 48 hours if not already ordered.  Follow for continued vancomycin needs, clinical response, and signs/symptoms of toxicity.       Maryellen Buck, PharmD           "

## 2024-03-14 NOTE — PROGRESS NOTES
Occupational Therapy    Occupational Therapy    Therapy Communication Note    Patient Name: Susan Farfan  MRN: [id number]  Today's Date: 3/14/2024     Missed Visit: Yes  Missed Visit Reason: Patient refused (Team notified.)      03/14/24 at 9:15 AM   Breanna CHAN/L, OTD  Rehab Office: [phone]

## 2024-03-14 NOTE — H&P
[facility]  ACUTE CARE SURGERY - HISTORY AND PHYSICAL     Patient Name: Susan Farfan  MRN: [id number]  Admit Date: 311  : 1965                      AGE: 58 y.o.                             GENDER: female  ==============================================================================  TODAY'S ASSESSMENT AND PLAN OF CARE:  Pt is 57yo F with hx of smoking and BG elevations who presents as transfer from OSH after 1.5 weeks of boil that began draining and developed into a soft tissue infection requiring debridement. She has leukocytosis to 12.3 and tachycardia resolved with fluids.     PLAN  - emergent OR with ACS for debridement of R perineum; discussed packing for wound care postop and multiple planned returns to OR if needed; she deemed her brother Earle in chart ([phone]) as proxy decision maker if needed  - admit to ACS after OR  - vanc/cefepime/clindamycin for empiric coverage of polymicrobial soft tissue infection     Seen and discussed with Attending, Dr. Buchanan.      Pager [phone]   Juan Ferrari MD      ==============================================================================  CHIEF COMPLAINT/REASON FOR CONSULT:  Pt is a 57yo F with hx of smoking and BG elevations who presents as transfer from OSH for concern for NSTI. PT had a R groin boil 1.5 weeks ago that she applied a warm compress to and it began to drain purulent fluid. When the pain and swelling did not resolve, she went to an urgent care 1 week ago and was prescribed clindamycin and mupirocin cream. Unfortunately, stinging burning pain and swelling worsened over the last week, prompting her to present to the OSH ED and she was transferred to  for further management.      She denies fevers, chills, and her tachycardia at  resolved with fluid. She received cefepime and vancomycin. Allergic to amoxicillin (red rash and swelling 10 yrs ago)     In the ED, she is afebrile, not tachycardic, without  hypotension. Leukocytosis to 12.3 and no other abnormal labs - normal lactate, no LUKAS, no electrolyte abnormalities.      PAST MEDICAL HISTORY:   PMH: HTN, pre-diabetes        PSH: Staph infection requiring operative debridement of breast/chest 10 yrs ago     FH: declined to answer     SOCIAL HISTORY:    Smoking/alcohol/drug use: declined to answer         MEDICATIONS: clindamycin and ibuprofen     ALLERGIES:        Allergies   Allergen Reactions    Amoxicillin Rash         REVIEW OF SYSTEMS:  Review of Systems     See above   PHYSICAL EXAM:  Physical Exam     Physical Exam      Constitutional - mildly uncomfortable at rest, in moderate distress from pain during perineal exam  Cards- regular rate   Resp- nonlabored breathing on room air   Abdomen- not distended  Extremities- NEWTON   Skin- warm, dry   Neuro- alert and oriented x3   Psych- appropriate mood   Tubes/lines- PIV     Perineum - R groin wound - estimated to be 20 cm of fibrinous exudate and eschar with surrounding erythema      IMAGING SUMMARY:  (summary of findings, not a copy of dictation)  CT - locules of air tracking from environment through superficial tissue      LABS:       Results from last 7 days   Lab Units 03/11/24 1912   WBC AUTO x10*3/uL 12.3*   HEMOGLOBIN g/dL 14.6   HEMATOCRIT % 43.4   PLATELETS AUTO x10*3/uL 303   NEUTROS PCT AUTO % 73.8   LYMPHS PCT AUTO % 16.4   MONOS PCT AUTO % 8.5   EOS PCT AUTO % 0.7               Results from last 7 days   Lab Units 03/11/24 1912   SODIUM mmol/L 137   POTASSIUM mmol/L 4.7   CHLORIDE mmol/L 101   CO2 mmol/L 24   BUN mg/dL 13   CREATININE mg/dL 0.90   CALCIUM mg/dL 9.4   PROTEIN TOTAL g/dL 8.0*   BILIRUBIN TOTAL mg/dL 0.4   ALK PHOS U/L 133*   ALT U/L 17   AST U/L 16   GLUCOSE mg/dL 117*           Results from last 7 days   Lab Units 03/11/24 1912   BILIRUBIN TOTAL mg/dL 0.4                I have reviewed all laboratory and imaging results ordered/pertinent for this encounter.       Solo Cosme,  MD

## 2024-03-15 LAB
ALBUMIN SERPL BCP-MCNC: 3.3 G/DL (ref 3.4–5)
ANION GAP SERPL CALC-SCNC: 16 MMOL/L (ref 10–20)
BASOPHILS # BLD AUTO: 0.04 X10*3/UL (ref 0–0.1)
BASOPHILS NFR BLD AUTO: 0.4 %
BUN SERPL-MCNC: 12 MG/DL (ref 6–23)
CALCIUM SERPL-MCNC: 9.3 MG/DL (ref 8.6–10.6)
CHLORIDE SERPL-SCNC: 99 MMOL/L (ref 98–107)
CO2 SERPL-SCNC: 27 MMOL/L (ref 21–32)
CREAT SERPL-MCNC: 0.69 MG/DL (ref 0.5–1.05)
EGFRCR SERPLBLD CKD-EPI 2021: >90 ML/MIN/1.73M*2
EOSINOPHIL # BLD AUTO: 0.15 X10*3/UL (ref 0–0.7)
EOSINOPHIL NFR BLD AUTO: 1.6 %
ERYTHROCYTE [DISTWIDTH] IN BLOOD BY AUTOMATED COUNT: 13.6 % (ref 11.5–14.5)
GLUCOSE BLD MANUAL STRIP-MCNC: 129 MG/DL (ref 74–99)
GLUCOSE SERPL-MCNC: 100 MG/DL (ref 74–99)
HCT VFR BLD AUTO: 42 % (ref 36–46)
HGB BLD-MCNC: 13.5 G/DL (ref 12–16)
IMM GRANULOCYTES # BLD AUTO: 0.03 X10*3/UL (ref 0–0.7)
IMM GRANULOCYTES NFR BLD AUTO: 0.3 % (ref 0–0.9)
LYMPHOCYTES # BLD AUTO: 2.17 X10*3/UL (ref 1.2–4.8)
LYMPHOCYTES NFR BLD AUTO: 23.7 %
MAGNESIUM SERPL-MCNC: 1.92 MG/DL (ref 1.6–2.4)
MCH RBC QN AUTO: 31.8 PG (ref 26–34)
MCHC RBC AUTO-ENTMCNC: 32.1 G/DL (ref 32–36)
MCV RBC AUTO: 99 FL (ref 80–100)
MONOCYTES # BLD AUTO: 0.77 X10*3/UL (ref 0.1–1)
MONOCYTES NFR BLD AUTO: 8.4 %
NEUTROPHILS # BLD AUTO: 6.01 X10*3/UL (ref 1.2–7.7)
NEUTROPHILS NFR BLD AUTO: 65.6 %
NRBC BLD-RTO: 0 /100 WBCS (ref 0–0)
PHOSPHATE SERPL-MCNC: 3.1 MG/DL (ref 2.5–4.9)
PLATELET # BLD AUTO: 281 X10*3/UL (ref 150–450)
POTASSIUM SERPL-SCNC: 3.8 MMOL/L (ref 3.5–5.3)
RBC # BLD AUTO: 4.24 X10*6/UL (ref 4–5.2)
SODIUM SERPL-SCNC: 138 MMOL/L (ref 136–145)
WBC # BLD AUTO: 9.2 X10*3/UL (ref 4.4–11.3)

## 2024-03-15 PROCEDURE — 82947 ASSAY GLUCOSE BLOOD QUANT: CPT

## 2024-03-15 PROCEDURE — 80069 RENAL FUNCTION PANEL: CPT | Performed by: PHYSICIAN ASSISTANT

## 2024-03-15 PROCEDURE — 83735 ASSAY OF MAGNESIUM: CPT | Performed by: PHYSICIAN ASSISTANT

## 2024-03-15 PROCEDURE — 2500000004 HC RX 250 GENERAL PHARMACY W/ HCPCS (ALT 636 FOR OP/ED): Performed by: PHYSICIAN ASSISTANT

## 2024-03-15 PROCEDURE — 2500000001 HC RX 250 WO HCPCS SELF ADMINISTERED DRUGS (ALT 637 FOR MEDICARE OP)

## 2024-03-15 PROCEDURE — 2500000001 HC RX 250 WO HCPCS SELF ADMINISTERED DRUGS (ALT 637 FOR MEDICARE OP): Performed by: NURSE PRACTITIONER

## 2024-03-15 PROCEDURE — 2500000004 HC RX 250 GENERAL PHARMACY W/ HCPCS (ALT 636 FOR OP/ED): Mod: JZ | Performed by: NURSE PRACTITIONER

## 2024-03-15 PROCEDURE — 1100000001 HC PRIVATE ROOM DAILY

## 2024-03-15 PROCEDURE — 85025 COMPLETE CBC W/AUTO DIFF WBC: CPT | Performed by: PHYSICIAN ASSISTANT

## 2024-03-15 PROCEDURE — 36415 COLL VENOUS BLD VENIPUNCTURE: CPT | Performed by: PHYSICIAN ASSISTANT

## 2024-03-15 RX ORDER — METRONIDAZOLE 500 MG/1
500 TABLET ORAL EVERY 8 HOURS SCHEDULED
Status: COMPLETED | OUTPATIENT
Start: 2024-03-15 | End: 2024-03-21

## 2024-03-15 RX ORDER — CIPROFLOXACIN 750 MG/1
750 TABLET, FILM COATED ORAL EVERY 12 HOURS SCHEDULED
Status: COMPLETED | OUTPATIENT
Start: 2024-03-15 | End: 2024-03-21

## 2024-03-15 RX ADMIN — OXYCODONE HYDROCHLORIDE 10 MG: 5 TABLET ORAL at 00:29

## 2024-03-15 RX ADMIN — OXYCODONE HYDROCHLORIDE 10 MG: 5 TABLET ORAL at 12:25

## 2024-03-15 RX ADMIN — ONDANSETRON 4 MG: 2 INJECTION INTRAMUSCULAR; INTRAVENOUS at 12:26

## 2024-03-15 RX ADMIN — MEROPENEM 1 G: 1 INJECTION, POWDER, FOR SOLUTION INTRAVENOUS at 06:05

## 2024-03-15 RX ADMIN — CLINDAMYCIN IN 5 PERCENT DEXTROSE 600 MG: 12 INJECTION, SOLUTION INTRAVENOUS at 02:49

## 2024-03-15 RX ADMIN — ACETAMINOPHEN 975 MG: 325 TABLET ORAL at 22:10

## 2024-03-15 RX ADMIN — METRONIDAZOLE 500 MG: 500 TABLET ORAL at 11:11

## 2024-03-15 RX ADMIN — ACETAMINOPHEN 975 MG: 325 TABLET ORAL at 12:30

## 2024-03-15 RX ADMIN — OXYCODONE HYDROCHLORIDE 10 MG: 5 TABLET ORAL at 18:00

## 2024-03-15 RX ADMIN — CIPROFLOXACIN 750 MG: 750 TABLET, FILM COATED ORAL at 11:11

## 2024-03-15 RX ADMIN — OXYCODONE HYDROCHLORIDE 10 MG: 5 TABLET ORAL at 22:13

## 2024-03-15 RX ADMIN — ACETAMINOPHEN 975 MG: 325 TABLET ORAL at 06:05

## 2024-03-15 RX ADMIN — CIPROFLOXACIN 750 MG: 750 TABLET, FILM COATED ORAL at 22:10

## 2024-03-15 RX ADMIN — METRONIDAZOLE 500 MG: 500 TABLET ORAL at 19:00

## 2024-03-15 ASSESSMENT — PAIN DESCRIPTION - LOCATION
LOCATION: GROIN

## 2024-03-15 ASSESSMENT — PAIN - FUNCTIONAL ASSESSMENT
PAIN_FUNCTIONAL_ASSESSMENT: 0-10

## 2024-03-15 ASSESSMENT — PAIN SCALES - GENERAL
PAINLEVEL_OUTOF10: 7
PAINLEVEL_OUTOF10: 9
PAINLEVEL_OUTOF10: 5 - MODERATE PAIN
PAINLEVEL_OUTOF10: 9
PAINLEVEL_OUTOF10: 7
PAINLEVEL_OUTOF10: 5 - MODERATE PAIN
PAINLEVEL_OUTOF10: 10 - WORST POSSIBLE PAIN
PAINLEVEL_OUTOF10: 10 - WORST POSSIBLE PAIN

## 2024-03-15 ASSESSMENT — COGNITIVE AND FUNCTIONAL STATUS - GENERAL
DAILY ACTIVITIY SCORE: 24
MOBILITY SCORE: 24

## 2024-03-15 ASSESSMENT — PAIN DESCRIPTION - DESCRIPTORS
DESCRIPTORS: SORE;SHARP
DESCRIPTORS: SHARP

## 2024-03-15 ASSESSMENT — PAIN DESCRIPTION - ORIENTATION
ORIENTATION: RIGHT

## 2024-03-15 ASSESSMENT — PAIN SCALES - PAIN ASSESSMENT IN ADVANCED DEMENTIA (PAINAD)
BREATHING: NORMAL
BREATHING: NORMAL

## 2024-03-15 NOTE — CARE PLAN
CHW met with patient at bedside, Patient stated she needed financial assistance with upcoming bills due to not working, CHW provided patient with [facility] for financial help with varies churches and organizations, application for cash assistance.        Community Resource Name:   Phone Number:   Staff Member:      Discussed the following topics on behalf of the patient:  []  Behavioral Health Assistance     []  Case Management  []   Assistance  []  Digital Equity Assistance  []  Dental Health Assistance  []  Education Assistance  []  Employment Assistance  [x]  Financial Strain Relief Assistance  []  Food Insecurity Assistance  []  Healthcare Coverage Assistance  []  Housing Stability Assistance  []  IP Violence Relief Assistance  []  Legal Assistance  []  Physical Activity Assistance  []  Social Connection Assistance  []  Stress Relief Assistance   []  Substance Abuse Assistance  []  Transportation Assistance  []  Utility Assistance  []  Other: [insert comment here]    Next Steps:         Taqueria Joseph LCSW

## 2024-03-15 NOTE — PROGRESS NOTES
Vancomycin Dosing by Pharmacy- Cessation of Therapy    Consult to pharmacy for vancomycin dosing has been discontinued by the prescriber, pharmacy will sign off at this time.    Please call pharmacy if there are further questions or re-enter a consult if vancomycin is resumed.     Jose Ron, TedD

## 2024-03-15 NOTE — NURSING NOTE
Patient refused Lovenox injection, Miralax, and stool softener. This RN educated the importance of anticoagulation post surgical intervention and the risks that come with refusing the medications such as DVT, PE. Pt also educated the importance of bowel regimen while using PCA pump. Pt verbalized understanding, Primary team aware.

## 2024-03-15 NOTE — PROGRESS NOTES
Physical Therapy    Therapy Communication Note    Patient Name: Susan Farfan  MRN: [id number]  Today's Date: 3/15/2024     Discipline: Physical Therapy      Missed Visit: Yes  Missed Visit Reason: Patient refused (Stated she is mobilizing independently and denies need for PT services. Will d/c PT order)      03/15/24 at 9:31 AM   Danielle Viera, PT

## 2024-03-15 NOTE — CARE PLAN
The patient's goals for the shift include      The clinical goals for the shift include pain control      Problem: Pain  Goal: Takes deep breaths with improved pain control throughout the shift  Outcome: Progressing  Goal: Turns in bed with improved pain control throughout the shift  Outcome: Progressing  Goal: Walks with improved pain control throughout the shift  Outcome: Progressing  Goal: Performs ADL's with improved pain control throughout shift  Outcome: Progressing  Goal: Participates in PT with improved pain control throughout the shift  Outcome: Progressing  Goal: Free from opioid side effects throughout the shift  Outcome: Progressing  Goal: Free from acute confusion related to pain meds throughout the shift  Outcome: Progressing     Problem: Skin  Goal: Decreased wound size/increased tissue granulation at next dressing change  Outcome: Progressing  Goal: Participates in plan/prevention/treatment measures  Outcome: Progressing  Goal: Prevent/manage excess moisture  Outcome: Progressing  Goal: Prevent/minimize sheer/friction injuries  Outcome: Progressing  Goal: Promote/optimize nutrition  Outcome: Progressing  Goal: Promote skin healing  Outcome: Progressing     Problem: Pain - Adult  Goal: Verbalizes/displays adequate comfort level or baseline comfort level  Outcome: Progressing     Problem: Safety - Adult  Goal: Free from fall injury  Outcome: Progressing     Problem: Discharge Planning  Goal: Discharge to home or other facility with appropriate resources  Outcome: Progressing     Problem: Chronic Conditions and Co-morbidities  Goal: Patient's chronic conditions and co-morbidity symptoms are monitored and maintained or improved  Outcome: Progressing

## 2024-03-15 NOTE — PROGRESS NOTES
[facility]  ACUTE CARE SURGERY - PROGRESS NOTE    Patient Name: Susan Farfan  MRN: [id number]  Admit Date: 311  : 1965  AGE: 58 y.o.   GENDER: female  ==============================================================================  TODAY'S ASSESSMENT AND PLAN OF CARE:  Susan Farfan is a 58F PMHx smoking, BG elevations, presents as a transfer for NSTI of perineum s/p debridement (3/12/24). Patient was brought to ICU for desaturation, improving currently 6L on Venti-mask, otherwise hemodynamically stable. HgbA1C was 7.0. Adjusting medications/nursing orders per patient's wishes.     NEURO/PAIN:  -PO tylenol  -PCA dilaudid     RESPIRATORY:  -IS     GI: HgbA1C 7  -regular diet     :   -no concerns     MSK:   -OOB as tolerated  -PT/OT     HEMATOLOGIC:   -monitor H&H      ENDOCRINE:   -stop POCT and insulin      INFECTIOUS DISEASE:   -cipro/flagyl (3/15- )  -Follow up Bcx and Intra-op Cx 3/12    -WOCN for wound vac to be applied today, patient agreeable     Lines   -Peripheral IV     DVT PROPHYLAXIS: lovenox , SCDs     Dispo:  Vac Monday, HHC Monday, PT/OT ordered, appreciate SW/care transitions for their help with dispo       ==============================================================================  CHIEF COMPLAINT / EVENTS LAST 24HRS / HPI:  No new concerns overnight.     MEDICAL HISTORY / ROS:   Admission history and ROS reviewed. Pertinent changes as follows:  None    PHYSICAL EXAM:  Heart Rate:  [66-93]   Temp:  [35.8 °C (96.4 °F)-36.4 °C (97.5 °F)]   Resp:  [18]   BP: (139-170)/(80-95)   SpO2:  [91 %-94 %]   Physical Exam  Constitutional - sitting in bed, NAD  Cards- RRR on tele  Resp- nonlabored breathing   Extremities- NEWTON   Skin- warm, dry   : voiding  Wound: wound bed pink, red with some bloody drainage  Neuro- alert and oriented x3   Psych- appropriate mood   IMAGING SUMMARY:  (summary of new imaging findings, not a copy of dictation)  none    LABS:  Results  from last 7 days   Lab Units 03/15/24  0559 03/14/24  0630 03/12/24  0533 03/11/24 1912   WBC AUTO x10*3/uL 9.2 8.1 13.1* 12.3*   HEMOGLOBIN g/dL 13.5 12.8 13.3 14.6   HEMATOCRIT % 42.0 38.5 42.8 43.4   PLATELETS AUTO x10*3/uL 281 260 277 303   NEUTROS PCT AUTO % 65.6  --  92.4 73.8   LYMPHS PCT AUTO % 23.7  --  5.3 16.4   MONOS PCT AUTO % 8.4  --  1.4 8.5   EOS PCT AUTO % 1.6  --  0.1 0.7     Results from last 7 days   Lab Units 03/12/24  0533   APTT seconds 23*   INR  1.1     Results from last 7 days   Lab Units 03/15/24  0559 03/14/24  0629 03/13/24  1632 03/11/24 1912   SODIUM mmol/L 138 138 136 137   POTASSIUM mmol/L 3.8 4.3 4.2 4.7   CHLORIDE mmol/L 99 101 100 101   CO2 mmol/L 27 29 29 24   BUN mg/dL 12 14 11 13   CREATININE mg/dL 0.69 0.78 0.77 0.90   CALCIUM mg/dL 9.3 8.8 8.7 9.4   PROTEIN TOTAL g/dL  --   --  6.8 8.0*   BILIRUBIN TOTAL mg/dL  --   --  0.4 0.4   ALK PHOS U/L  --   --  89 133*   ALT U/L  --   --  10 17   AST U/L  --   --  10 16   GLUCOSE mg/dL 100* 119* 96 117*     Results from last 7 days   Lab Units 03/13/24  1632 03/11/24 1912   BILIRUBIN TOTAL mg/dL 0.4 0.4           I have reviewed all medications, laboratory results, and imaging pertinent for today's encounter.

## 2024-03-15 NOTE — PROGRESS NOTES
Occupational Therapy    Therapy Communication Note    Patient Name: Susan Farfan  MRN: [id number]  Today's Date: 3/15/2024     Missed Visit: Yes  Missed Visit Reason: Parent refused (will d/c OT order as pt reported being independent and does not wish to participate in an OT eval.)      03/15/24 at 9:31 AM   Breanna CHAN/L, OTD  Rehab Office: [phone]

## 2024-03-16 LAB
ALBUMIN SERPL BCP-MCNC: 3.3 G/DL (ref 3.4–5)
ANION GAP SERPL CALC-SCNC: 12 MMOL/L (ref 10–20)
BACTERIA BLD CULT: NORMAL
BACTERIA BLD CULT: NORMAL
BASOPHILS # BLD AUTO: 0.05 X10*3/UL (ref 0–0.1)
BASOPHILS NFR BLD AUTO: 0.6 %
BUN SERPL-MCNC: 17 MG/DL (ref 6–23)
CALCIUM SERPL-MCNC: 9.2 MG/DL (ref 8.6–10.6)
CHLORIDE SERPL-SCNC: 101 MMOL/L (ref 98–107)
CO2 SERPL-SCNC: 29 MMOL/L (ref 21–32)
CREAT SERPL-MCNC: 0.86 MG/DL (ref 0.5–1.05)
EGFRCR SERPLBLD CKD-EPI 2021: 78 ML/MIN/1.73M*2
EOSINOPHIL # BLD AUTO: 0.24 X10*3/UL (ref 0–0.7)
EOSINOPHIL NFR BLD AUTO: 2.7 %
ERYTHROCYTE [DISTWIDTH] IN BLOOD BY AUTOMATED COUNT: 13.7 % (ref 11.5–14.5)
GLUCOSE SERPL-MCNC: 121 MG/DL (ref 74–99)
HCT VFR BLD AUTO: 40.1 % (ref 36–46)
HGB BLD-MCNC: 12.7 G/DL (ref 12–16)
IMM GRANULOCYTES # BLD AUTO: 0.04 X10*3/UL (ref 0–0.7)
IMM GRANULOCYTES NFR BLD AUTO: 0.4 % (ref 0–0.9)
LYMPHOCYTES # BLD AUTO: 1.99 X10*3/UL (ref 1.2–4.8)
LYMPHOCYTES NFR BLD AUTO: 22.1 %
MAGNESIUM SERPL-MCNC: 1.82 MG/DL (ref 1.6–2.4)
MCH RBC QN AUTO: 31 PG (ref 26–34)
MCHC RBC AUTO-ENTMCNC: 31.7 G/DL (ref 32–36)
MCV RBC AUTO: 98 FL (ref 80–100)
MONOCYTES # BLD AUTO: 0.79 X10*3/UL (ref 0.1–1)
MONOCYTES NFR BLD AUTO: 8.8 %
NEUTROPHILS # BLD AUTO: 5.88 X10*3/UL (ref 1.2–7.7)
NEUTROPHILS NFR BLD AUTO: 65.4 %
NRBC BLD-RTO: 0 /100 WBCS (ref 0–0)
PHOSPHATE SERPL-MCNC: 3.4 MG/DL (ref 2.5–4.9)
PLATELET # BLD AUTO: 281 X10*3/UL (ref 150–450)
POTASSIUM SERPL-SCNC: 4.2 MMOL/L (ref 3.5–5.3)
RBC # BLD AUTO: 4.1 X10*6/UL (ref 4–5.2)
SODIUM SERPL-SCNC: 138 MMOL/L (ref 136–145)
WBC # BLD AUTO: 9 X10*3/UL (ref 4.4–11.3)

## 2024-03-16 PROCEDURE — 2500000001 HC RX 250 WO HCPCS SELF ADMINISTERED DRUGS (ALT 637 FOR MEDICARE OP)

## 2024-03-16 PROCEDURE — 1100000001 HC PRIVATE ROOM DAILY

## 2024-03-16 PROCEDURE — 36415 COLL VENOUS BLD VENIPUNCTURE: CPT

## 2024-03-16 PROCEDURE — 80069 RENAL FUNCTION PANEL: CPT

## 2024-03-16 PROCEDURE — 83735 ASSAY OF MAGNESIUM: CPT

## 2024-03-16 PROCEDURE — 2500000001 HC RX 250 WO HCPCS SELF ADMINISTERED DRUGS (ALT 637 FOR MEDICARE OP): Performed by: NURSE PRACTITIONER

## 2024-03-16 PROCEDURE — 85025 COMPLETE CBC W/AUTO DIFF WBC: CPT

## 2024-03-16 PROCEDURE — 2500000004 HC RX 250 GENERAL PHARMACY W/ HCPCS (ALT 636 FOR OP/ED): Performed by: PHYSICIAN ASSISTANT

## 2024-03-16 RX ADMIN — CIPROFLOXACIN 750 MG: 750 TABLET, FILM COATED ORAL at 20:33

## 2024-03-16 RX ADMIN — ONDANSETRON 4 MG: 2 INJECTION INTRAMUSCULAR; INTRAVENOUS at 08:42

## 2024-03-16 RX ADMIN — ACETAMINOPHEN 975 MG: 325 TABLET ORAL at 05:25

## 2024-03-16 RX ADMIN — OXYCODONE HYDROCHLORIDE 10 MG: 5 TABLET ORAL at 19:48

## 2024-03-16 RX ADMIN — ONDANSETRON 4 MG: 2 INJECTION INTRAMUSCULAR; INTRAVENOUS at 20:33

## 2024-03-16 RX ADMIN — METRONIDAZOLE 500 MG: 500 TABLET ORAL at 20:33

## 2024-03-16 RX ADMIN — METRONIDAZOLE 500 MG: 500 TABLET ORAL at 03:06

## 2024-03-16 RX ADMIN — ACETAMINOPHEN 975 MG: 325 TABLET ORAL at 13:29

## 2024-03-16 RX ADMIN — ACETAMINOPHEN 975 MG: 325 TABLET ORAL at 21:35

## 2024-03-16 RX ADMIN — OXYCODONE HYDROCHLORIDE 10 MG: 5 TABLET ORAL at 05:32

## 2024-03-16 RX ADMIN — CIPROFLOXACIN 750 MG: 750 TABLET, FILM COATED ORAL at 08:40

## 2024-03-16 RX ADMIN — METRONIDAZOLE 500 MG: 500 TABLET ORAL at 10:49

## 2024-03-16 ASSESSMENT — COGNITIVE AND FUNCTIONAL STATUS - GENERAL
MOBILITY SCORE: 24
MOBILITY SCORE: 24
DAILY ACTIVITIY SCORE: 24
DAILY ACTIVITIY SCORE: 24

## 2024-03-16 ASSESSMENT — PAIN - FUNCTIONAL ASSESSMENT
PAIN_FUNCTIONAL_ASSESSMENT: 0-10

## 2024-03-16 ASSESSMENT — PAIN SCALES - GENERAL
PAINLEVEL_OUTOF10: 9
PAINLEVEL_OUTOF10: 7
PAINLEVEL_OUTOF10: 9
PAINLEVEL_OUTOF10: 0 - NO PAIN

## 2024-03-16 ASSESSMENT — PAIN DESCRIPTION - ORIENTATION: ORIENTATION: RIGHT

## 2024-03-16 ASSESSMENT — PAIN DESCRIPTION - LOCATION: LOCATION: GROIN

## 2024-03-16 NOTE — PROGRESS NOTES
[facility]  ACUTE CARE SURGERY - PROGRESS NOTE    Patient Name: Susan Farfan  MRN: [id number]  Admit Date: 311  : 1965  AGE: 58 y.o.   GENDER: female  ==============================================================================  TODAY'S ASSESSMENT AND PLAN OF CARE:  Susan Farfan is a 58F PMHx smoking, BG elevations, presents as a transfer for NSTI of perineum s/p debridement (3/12/24). Patient was brought to ICU for desaturation, improving currently 6L on Venti-mask, otherwise hemodynamically stable. HgbA1C was 7.0. Adjusting medications/nursing orders per patient's wishes. 3/14 wound vac placed.     NEURO/PAIN:  -PO tylenol  -PCA dilaudid     RESPIRATORY:  -IS     GI: HgbA1C 7  -regular diet     :   -no concerns     MSK:   -OOB as tolerated  -PT/OT     HEMATOLOGIC:   -monitor H&H      ENDOCRINE:   -stop POCT and insulin      INFECTIOUS DISEASE:   -cipro/flagyl (3/15- )  -Bcx NGTD x4 and Intra-op Cx 3/12 with mixed anaerobic bacteria      Lines   -Peripheral IV     DVT PROPHYLAXIS: lovenox , SCDs     Dispo: Home with home care if patient is amenable.     Can Alaniz MD   PGY1, [facility]  Acute care surgery   Pager [phone]  ==============================================================================  CHIEF COMPLAINT / EVENTS LAST 24HRS / HPI:  No new concerns overnight. Patient declined PT/OT.     MEDICAL HISTORY / ROS:   Admission history and ROS reviewed. Pertinent changes as follows:  None    PHYSICAL EXAM:  Heart Rate:  [68-97]   Temp:  [35.8 °C (96.4 °F)-36.3 °C (97.3 °F)]   Resp:  [16-19]   BP: (129-175)/(77-88)   SpO2:  [90 %-95 %]   Physical Exam  Constitutional - sitting in bed, NAD  Cards- RRR on tele  Resp- nonlabored breathing   Extremities- NEWTON   Skin- warm, dry   : voiding  Wound: wound bed pink, red with some bloody drainage  Neuro- alert and oriented x3   Psych- appropriate mood     IMAGING SUMMARY:  (summary of new imaging findings,  not a copy of dictation)  none    LABS:  Results from last 7 days   Lab Units 03/16/24  0505 03/15/24  0559 03/14/24  0630 03/12/24  0533   WBC AUTO x10*3/uL 9.0 9.2 8.1 13.1*   HEMOGLOBIN g/dL 12.7 13.5 12.8 13.3   HEMATOCRIT % 40.1 42.0 38.5 42.8   PLATELETS AUTO x10*3/uL 281 281 260 277   NEUTROS PCT AUTO % 65.4 65.6  --  92.4   LYMPHS PCT AUTO % 22.1 23.7  --  5.3   MONOS PCT AUTO % 8.8 8.4  --  1.4   EOS PCT AUTO % 2.7 1.6  --  0.1     Results from last 7 days   Lab Units 03/12/24  0533   APTT seconds 23*   INR  1.1     Results from last 7 days   Lab Units 03/16/24  0505 03/15/24  0559 03/14/24  0629 03/13/24  1632 03/11/24  1912   SODIUM mmol/L 138 138 138 136 137   POTASSIUM mmol/L 4.2 3.8 4.3 4.2 4.7   CHLORIDE mmol/L 101 99 101 100 101   CO2 mmol/L 29 27 29 29 24   BUN mg/dL 17 12 14 11 13   CREATININE mg/dL 0.86 0.69 0.78 0.77 0.90   CALCIUM mg/dL 9.2 9.3 8.8 8.7 9.4   PROTEIN TOTAL g/dL  --   --   --  6.8 8.0*   BILIRUBIN TOTAL mg/dL  --   --   --  0.4 0.4   ALK PHOS U/L  --   --   --  89 133*   ALT U/L  --   --   --  10 17   AST U/L  --   --   --  10 16   GLUCOSE mg/dL 121* 100* 119* 96 117*     Results from last 7 days   Lab Units 03/13/24 1632 03/11/24 1912   BILIRUBIN TOTAL mg/dL 0.4 0.4           I have reviewed all medications, laboratory results, and imaging pertinent for today's encounter.

## 2024-03-16 NOTE — NURSING NOTE
"This nurse heard patient yell out help, upon entering room patient is adjusting head of bed and states she fell.  \"I was dribbling pee walking to the bathroom and slipped on it.\"  Patient denies hitting her head and denies any injury at this time.   Vital signs obtained, patient assisted back to bed and helped with comfort.    This nurse asked if she wanted emergency contact notified and she stated \"no its my son he is at work anyway.\"  Patient apologized for falling and states she did not want me to get in trouble.   This nurse states I have to notify team of fall, she asked me not to.  Then stated \"I should of never said anything I am fine.\"  Patient is asked to use call-light for assistance and educated on being a fall risk and requesting assistance with toileting.      Dr. Malin notified of fall at 2230 via secure message, no new orders received.  AN notified of fall as well at this time.           "

## 2024-03-17 PROCEDURE — 2500000001 HC RX 250 WO HCPCS SELF ADMINISTERED DRUGS (ALT 637 FOR MEDICARE OP)

## 2024-03-17 PROCEDURE — 1100000001 HC PRIVATE ROOM DAILY

## 2024-03-17 PROCEDURE — 2500000001 HC RX 250 WO HCPCS SELF ADMINISTERED DRUGS (ALT 637 FOR MEDICARE OP): Performed by: NURSE PRACTITIONER

## 2024-03-17 PROCEDURE — 2500000004 HC RX 250 GENERAL PHARMACY W/ HCPCS (ALT 636 FOR OP/ED): Performed by: PHYSICIAN ASSISTANT

## 2024-03-17 PROCEDURE — 2500000004 HC RX 250 GENERAL PHARMACY W/ HCPCS (ALT 636 FOR OP/ED)

## 2024-03-17 RX ORDER — OXYCODONE HYDROCHLORIDE 5 MG/1
10 TABLET ORAL EVERY 4 HOURS
Status: DISCONTINUED | OUTPATIENT
Start: 2024-03-17 | End: 2024-03-19

## 2024-03-17 RX ADMIN — OXYCODONE HYDROCHLORIDE 10 MG: 5 TABLET ORAL at 16:53

## 2024-03-17 RX ADMIN — OXYCODONE HYDROCHLORIDE 10 MG: 5 TABLET ORAL at 10:15

## 2024-03-17 RX ADMIN — ACETAMINOPHEN 975 MG: 325 TABLET ORAL at 04:51

## 2024-03-17 RX ADMIN — METRONIDAZOLE 500 MG: 500 TABLET ORAL at 10:15

## 2024-03-17 RX ADMIN — ACETAMINOPHEN 975 MG: 325 TABLET ORAL at 12:51

## 2024-03-17 RX ADMIN — OXYCODONE HYDROCHLORIDE 10 MG: 5 TABLET ORAL at 20:47

## 2024-03-17 RX ADMIN — Medication: at 09:52

## 2024-03-17 RX ADMIN — CIPROFLOXACIN 750 MG: 750 TABLET, FILM COATED ORAL at 20:47

## 2024-03-17 RX ADMIN — ONDANSETRON 4 MG: 2 INJECTION INTRAMUSCULAR; INTRAVENOUS at 20:57

## 2024-03-17 RX ADMIN — METRONIDAZOLE 500 MG: 500 TABLET ORAL at 04:45

## 2024-03-17 RX ADMIN — ACETAMINOPHEN 975 MG: 325 TABLET ORAL at 20:47

## 2024-03-17 RX ADMIN — CIPROFLOXACIN 750 MG: 750 TABLET, FILM COATED ORAL at 10:16

## 2024-03-17 RX ADMIN — ONDANSETRON 4 MG: 2 INJECTION INTRAMUSCULAR; INTRAVENOUS at 09:57

## 2024-03-17 RX ADMIN — METRONIDAZOLE 500 MG: 500 TABLET ORAL at 19:30

## 2024-03-17 RX ADMIN — OXYCODONE HYDROCHLORIDE 10 MG: 5 TABLET ORAL at 00:12

## 2024-03-17 RX ADMIN — OXYCODONE HYDROCHLORIDE 10 MG: 5 TABLET ORAL at 04:45

## 2024-03-17 ASSESSMENT — COGNITIVE AND FUNCTIONAL STATUS - GENERAL
DRESSING REGULAR LOWER BODY CLOTHING: A LITTLE
HELP NEEDED FOR BATHING: A LITTLE
MOVING FROM LYING ON BACK TO SITTING ON SIDE OF FLAT BED WITH BEDRAILS: A LITTLE
DRESSING REGULAR LOWER BODY CLOTHING: A LITTLE
PERSONAL GROOMING: A LITTLE
DRESSING REGULAR LOWER BODY CLOTHING: A LITTLE
DRESSING REGULAR UPPER BODY CLOTHING: A LITTLE
WALKING IN HOSPITAL ROOM: A LITTLE
DAILY ACTIVITIY SCORE: 18
TURNING FROM BACK TO SIDE WHILE IN FLAT BAD: A LITTLE
HELP NEEDED FOR BATHING: A LITTLE
WALKING IN HOSPITAL ROOM: A LITTLE
TOILETING: A LITTLE
WALKING IN HOSPITAL ROOM: A LITTLE
PERSONAL GROOMING: A LITTLE
PERSONAL GROOMING: A LITTLE
DRESSING REGULAR LOWER BODY CLOTHING: A LITTLE
DRESSING REGULAR LOWER BODY CLOTHING: A LITTLE
STANDING UP FROM CHAIR USING ARMS: A LITTLE
STANDING UP FROM CHAIR USING ARMS: A LITTLE
TOILETING: A LITTLE
EATING MEALS: A LITTLE
DRESSING REGULAR LOWER BODY CLOTHING: A LITTLE
HELP NEEDED FOR BATHING: A LITTLE
MOVING FROM LYING ON BACK TO SITTING ON SIDE OF FLAT BED WITH BEDRAILS: A LITTLE
PERSONAL GROOMING: A LITTLE
PERSONAL GROOMING: A LITTLE
MOBILITY SCORE: 18
MOVING TO AND FROM BED TO CHAIR: A LITTLE
DRESSING REGULAR UPPER BODY CLOTHING: A LITTLE
DAILY ACTIVITIY SCORE: 19
TOILETING: A LITTLE
MOBILITY SCORE: 18
TOILETING: A LITTLE
DAILY ACTIVITIY SCORE: 19
DAILY ACTIVITIY SCORE: 19
CLIMB 3 TO 5 STEPS WITH RAILING: A LITTLE
MOBILITY SCORE: 18
MOBILITY SCORE: 18
TOILETING: A LITTLE
TURNING FROM BACK TO SIDE WHILE IN FLAT BAD: A LITTLE
MOVING TO AND FROM BED TO CHAIR: A LITTLE
MOVING TO AND FROM BED TO CHAIR: A LITTLE
CLIMB 3 TO 5 STEPS WITH RAILING: A LITTLE
DRESSING REGULAR UPPER BODY CLOTHING: A LITTLE
WALKING IN HOSPITAL ROOM: A LITTLE
MOVING FROM LYING ON BACK TO SITTING ON SIDE OF FLAT BED WITH BEDRAILS: A LITTLE
CLIMB 3 TO 5 STEPS WITH RAILING: A LITTLE
WALKING IN HOSPITAL ROOM: A LITTLE
DAILY ACTIVITIY SCORE: 18
TURNING FROM BACK TO SIDE WHILE IN FLAT BAD: A LITTLE
HELP NEEDED FOR BATHING: A LITTLE
CLIMB 3 TO 5 STEPS WITH RAILING: A LITTLE
WALKING IN HOSPITAL ROOM: A LITTLE
TURNING FROM BACK TO SIDE WHILE IN FLAT BAD: A LITTLE
MOVING TO AND FROM BED TO CHAIR: A LITTLE
PERSONAL GROOMING: A LITTLE
STANDING UP FROM CHAIR USING ARMS: A LITTLE
MOVING TO AND FROM BED TO CHAIR: A LITTLE
STANDING UP FROM CHAIR USING ARMS: A LITTLE
TURNING FROM BACK TO SIDE WHILE IN FLAT BAD: A LITTLE
STANDING UP FROM CHAIR USING ARMS: A LITTLE
MOBILITY SCORE: 24
MOVING FROM LYING ON BACK TO SITTING ON SIDE OF FLAT BED WITH BEDRAILS: A LITTLE
CLIMB 3 TO 5 STEPS WITH RAILING: A LITTLE
MOBILITY SCORE: 18
HELP NEEDED FOR BATHING: A LITTLE
PERSONAL GROOMING: A LITTLE
CLIMB 3 TO 5 STEPS WITH RAILING: A LITTLE
STANDING UP FROM CHAIR USING ARMS: A LITTLE
WALKING IN HOSPITAL ROOM: A LITTLE
TOILETING: A LITTLE
WALKING IN HOSPITAL ROOM: A LITTLE
TURNING FROM BACK TO SIDE WHILE IN FLAT BAD: A LITTLE
MOVING FROM LYING ON BACK TO SITTING ON SIDE OF FLAT BED WITH BEDRAILS: A LITTLE
STANDING UP FROM CHAIR USING ARMS: A LITTLE
MOVING TO AND FROM BED TO CHAIR: A LITTLE
DRESSING REGULAR UPPER BODY CLOTHING: A LITTLE
STANDING UP FROM CHAIR USING ARMS: A LITTLE
MOVING FROM LYING ON BACK TO SITTING ON SIDE OF FLAT BED WITH BEDRAILS: A LITTLE
DAILY ACTIVITIY SCORE: 19
MOBILITY SCORE: 18
DRESSING REGULAR UPPER BODY CLOTHING: A LITTLE
MOVING FROM LYING ON BACK TO SITTING ON SIDE OF FLAT BED WITH BEDRAILS: A LITTLE
HELP NEEDED FOR BATHING: A LITTLE
EATING MEALS: A LITTLE
HELP NEEDED FOR BATHING: A LITTLE
HELP NEEDED FOR BATHING: A LITTLE
TOILETING: A LITTLE
TOILETING: A LITTLE
TURNING FROM BACK TO SIDE WHILE IN FLAT BAD: A LITTLE
TURNING FROM BACK TO SIDE WHILE IN FLAT BAD: A LITTLE
PERSONAL GROOMING: A LITTLE
CLIMB 3 TO 5 STEPS WITH RAILING: A LITTLE
DRESSING REGULAR LOWER BODY CLOTHING: A LITTLE
MOVING TO AND FROM BED TO CHAIR: A LITTLE
DRESSING REGULAR LOWER BODY CLOTHING: A LITTLE
MOVING FROM LYING ON BACK TO SITTING ON SIDE OF FLAT BED WITH BEDRAILS: A LITTLE
MOVING TO AND FROM BED TO CHAIR: A LITTLE
DRESSING REGULAR UPPER BODY CLOTHING: A LITTLE
CLIMB 3 TO 5 STEPS WITH RAILING: A LITTLE

## 2024-03-17 ASSESSMENT — PAIN - FUNCTIONAL ASSESSMENT
PAIN_FUNCTIONAL_ASSESSMENT: 0-10

## 2024-03-17 ASSESSMENT — PAIN SCALES - GENERAL
PAINLEVEL_OUTOF10: 10 - WORST POSSIBLE PAIN
PAINLEVEL_OUTOF10: 10 - WORST POSSIBLE PAIN
PAINLEVEL_OUTOF10: 9
PAINLEVEL_OUTOF10: 7
PAINLEVEL_OUTOF10: 8
PAINLEVEL_OUTOF10: 3

## 2024-03-17 ASSESSMENT — PAIN DESCRIPTION - LOCATION
LOCATION: ABDOMEN
LOCATION: GROIN

## 2024-03-17 ASSESSMENT — PAIN SCALES - PAIN ASSESSMENT IN ADVANCED DEMENTIA (PAINAD)
CONSOLABILITY: NO NEED TO CONSOLE
BODYLANGUAGE: RELAXED

## 2024-03-17 ASSESSMENT — PAIN DESCRIPTION - ORIENTATION: ORIENTATION: MID

## 2024-03-17 NOTE — CARE PLAN
The patient's goals for the shift include  patient will remain safe and free from falls    The clinical goals for the shift include patient will remain safe and free from falls

## 2024-03-17 NOTE — PROGRESS NOTES
[facility]  ACUTE CARE SURGERY - PROGRESS NOTE    Patient Name: Susan Farfan  MRN: [id number]  Admit Date: 311  : 1965  AGE: 58 y.o.   GENDER: female  ==============================================================================  TODAY'S ASSESSMENT AND PLAN OF CARE:  Susan Farfan is a 58F PMHx smoking, BG elevations, presents as a transfer for NSTI of perineum s/p debridement (3/12/24). Patient was brought to ICU for desaturation, improving currently 6L on Venti-mask, otherwise hemodynamically stable. HgbA1C was 7.0. Adjusting medications/nursing orders per patient's wishes. 3/14 wound vac placed. Vac change tomorrow.     NEURO/PAIN:  -PO tylenol  -PCA dilaudid     RESPIRATORY:  -IS     GI: HgbA1C 7  -regular diet     :   -no concerns     MSK:   -OOB as tolerated  -PT/OT     HEMATOLOGIC:   -monitor H&H      ENDOCRINE:   -stop POCT and insulin      INFECTIOUS DISEASE:   -cipro/flagyl (3/15- 3/21)  -Bcx NGTD x4 and Intra-op Cx 3/12 with mixed anaerobic bacteria      Lines   -Peripheral IV     DVT PROPHYLAXIS: lovenox , SCDs     Dispo: Patient amenable to PT/OT tomorrow for SNF placement     Can Alaniz MD   PGY1, [facility]  Acute care surgery   Pager [phone]  ==============================================================================  CHIEF COMPLAINT / EVENTS LAST 24HRS / HPI:  No new concerns overnight. Patient declined PT/OT.     MEDICAL HISTORY / ROS:   Admission history and ROS reviewed. Pertinent changes as follows:  None    PHYSICAL EXAM:  Heart Rate:  [71-80]   Temp:  [36.9 °C (98.4 °F)]   Resp:  [18]   BP: (129-144)/(74-82)   SpO2:  [89 %]   Physical Exam  Constitutional - sitting in bed, NAD  Cards- RRR on tele  Resp- nonlabored breathing   Extremities- NEWTON   Skin- warm, dry   : voiding  Wound: wound bed pink, red with some bloody drainage  Neuro- alert and oriented x3   Psych- appropriate mood     IMAGING SUMMARY:  (summary of new imaging  findings, not a copy of dictation)  none    LABS:  Results from last 7 days   Lab Units 03/16/24  0505 03/15/24  0559 03/14/24  0630 03/12/24  0533   WBC AUTO x10*3/uL 9.0 9.2 8.1 13.1*   HEMOGLOBIN g/dL 12.7 13.5 12.8 13.3   HEMATOCRIT % 40.1 42.0 38.5 42.8   PLATELETS AUTO x10*3/uL 281 281 260 277   NEUTROS PCT AUTO % 65.4 65.6  --  92.4   LYMPHS PCT AUTO % 22.1 23.7  --  5.3   MONOS PCT AUTO % 8.8 8.4  --  1.4   EOS PCT AUTO % 2.7 1.6  --  0.1     Results from last 7 days   Lab Units 03/12/24  0533   APTT seconds 23*   INR  1.1     Results from last 7 days   Lab Units 03/16/24  0505 03/15/24  0559 03/14/24  0629 03/13/24  1632 03/11/24  1912   SODIUM mmol/L 138 138 138 136 137   POTASSIUM mmol/L 4.2 3.8 4.3 4.2 4.7   CHLORIDE mmol/L 101 99 101 100 101   CO2 mmol/L 29 27 29 29 24   BUN mg/dL 17 12 14 11 13   CREATININE mg/dL 0.86 0.69 0.78 0.77 0.90   CALCIUM mg/dL 9.2 9.3 8.8 8.7 9.4   PROTEIN TOTAL g/dL  --   --   --  6.8 8.0*   BILIRUBIN TOTAL mg/dL  --   --   --  0.4 0.4   ALK PHOS U/L  --   --   --  89 133*   ALT U/L  --   --   --  10 17   AST U/L  --   --   --  10 16   GLUCOSE mg/dL 121* 100* 119* 96 117*     Results from last 7 days   Lab Units 03/13/24 1632 03/11/24 1912   BILIRUBIN TOTAL mg/dL 0.4 0.4           I have reviewed all medications, laboratory results, and imaging pertinent for today's encounter.

## 2024-03-18 PROCEDURE — 2500000004 HC RX 250 GENERAL PHARMACY W/ HCPCS (ALT 636 FOR OP/ED): Performed by: STUDENT IN AN ORGANIZED HEALTH CARE EDUCATION/TRAINING PROGRAM

## 2024-03-18 PROCEDURE — 97161 PT EVAL LOW COMPLEX 20 MIN: CPT | Mod: GP

## 2024-03-18 PROCEDURE — 2500000001 HC RX 250 WO HCPCS SELF ADMINISTERED DRUGS (ALT 637 FOR MEDICARE OP)

## 2024-03-18 PROCEDURE — 2500000001 HC RX 250 WO HCPCS SELF ADMINISTERED DRUGS (ALT 637 FOR MEDICARE OP): Performed by: NURSE PRACTITIONER

## 2024-03-18 PROCEDURE — 2500000004 HC RX 250 GENERAL PHARMACY W/ HCPCS (ALT 636 FOR OP/ED): Performed by: PHYSICIAN ASSISTANT

## 2024-03-18 PROCEDURE — 1100000001 HC PRIVATE ROOM DAILY

## 2024-03-18 RX ORDER — MORPHINE SULFATE 2 MG/ML
1 INJECTION, SOLUTION INTRAMUSCULAR; INTRAVENOUS ONCE
Status: COMPLETED | OUTPATIENT
Start: 2024-03-18 | End: 2024-03-18

## 2024-03-18 RX ADMIN — ACETAMINOPHEN 975 MG: 325 TABLET ORAL at 22:19

## 2024-03-18 RX ADMIN — METRONIDAZOLE 500 MG: 500 TABLET ORAL at 22:19

## 2024-03-18 RX ADMIN — ACETAMINOPHEN 975 MG: 325 TABLET ORAL at 15:39

## 2024-03-18 RX ADMIN — ONDANSETRON 4 MG: 2 INJECTION INTRAMUSCULAR; INTRAVENOUS at 22:20

## 2024-03-18 RX ADMIN — CIPROFLOXACIN 750 MG: 750 TABLET, FILM COATED ORAL at 22:19

## 2024-03-18 RX ADMIN — CIPROFLOXACIN 750 MG: 750 TABLET, FILM COATED ORAL at 09:40

## 2024-03-18 RX ADMIN — OXYCODONE HYDROCHLORIDE 5 MG: 5 TABLET ORAL at 10:00

## 2024-03-18 RX ADMIN — METRONIDAZOLE 500 MG: 500 TABLET ORAL at 11:18

## 2024-03-18 RX ADMIN — OXYCODONE HYDROCHLORIDE 10 MG: 5 TABLET ORAL at 00:42

## 2024-03-18 RX ADMIN — OXYCODONE HYDROCHLORIDE 5 MG: 5 TABLET ORAL at 22:19

## 2024-03-18 RX ADMIN — METRONIDAZOLE 500 MG: 500 TABLET ORAL at 03:00

## 2024-03-18 RX ADMIN — ACETAMINOPHEN 975 MG: 325 TABLET ORAL at 04:47

## 2024-03-18 RX ADMIN — OXYCODONE HYDROCHLORIDE 10 MG: 5 TABLET ORAL at 04:47

## 2024-03-18 RX ADMIN — MORPHINE SULFATE 1 MG: 2 INJECTION, SOLUTION INTRAMUSCULAR; INTRAVENOUS at 11:17

## 2024-03-18 RX ADMIN — ONDANSETRON 4 MG: 2 INJECTION INTRAMUSCULAR; INTRAVENOUS at 09:58

## 2024-03-18 ASSESSMENT — COGNITIVE AND FUNCTIONAL STATUS - GENERAL
STANDING UP FROM CHAIR USING ARMS: A LITTLE
MOVING FROM LYING ON BACK TO SITTING ON SIDE OF FLAT BED WITH BEDRAILS: A LITTLE
DRESSING REGULAR UPPER BODY CLOTHING: A LITTLE
DRESSING REGULAR UPPER BODY CLOTHING: A LITTLE
WALKING IN HOSPITAL ROOM: A LITTLE
DRESSING REGULAR LOWER BODY CLOTHING: A LITTLE
WALKING IN HOSPITAL ROOM: A LITTLE
EATING MEALS: A LITTLE
TOILETING: A LITTLE
MOBILITY SCORE: 18
MOVING FROM LYING ON BACK TO SITTING ON SIDE OF FLAT BED WITH BEDRAILS: A LITTLE
STANDING UP FROM CHAIR USING ARMS: A LITTLE
EATING MEALS: A LITTLE
CLIMB 3 TO 5 STEPS WITH RAILING: A LITTLE
MOBILITY SCORE: 18
PERSONAL GROOMING: A LITTLE
MOVING TO AND FROM BED TO CHAIR: A LITTLE
TURNING FROM BACK TO SIDE WHILE IN FLAT BAD: A LITTLE
HELP NEEDED FOR BATHING: A LITTLE
DAILY ACTIVITIY SCORE: 18
TOILETING: A LITTLE
MOVING TO AND FROM BED TO CHAIR: A LITTLE
CLIMB 3 TO 5 STEPS WITH RAILING: TOTAL
HELP NEEDED FOR BATHING: A LITTLE
MOBILITY SCORE: 18
MOVING TO AND FROM BED TO CHAIR: A LITTLE
DAILY ACTIVITIY SCORE: 18
PERSONAL GROOMING: A LITTLE
WALKING IN HOSPITAL ROOM: A LITTLE
TURNING FROM BACK TO SIDE WHILE IN FLAT BAD: A LITTLE
CLIMB 3 TO 5 STEPS WITH RAILING: A LITTLE
STANDING UP FROM CHAIR USING ARMS: A LITTLE
DRESSING REGULAR LOWER BODY CLOTHING: A LITTLE

## 2024-03-18 ASSESSMENT — ACTIVITIES OF DAILY LIVING (ADL): ADL_ASSISTANCE: INDEPENDENT

## 2024-03-18 ASSESSMENT — PAIN DESCRIPTION - ORIENTATION
ORIENTATION: MID
ORIENTATION: MID

## 2024-03-18 ASSESSMENT — PAIN DESCRIPTION - LOCATION
LOCATION: GROIN

## 2024-03-18 ASSESSMENT — PAIN SCALES - GENERAL
PAINLEVEL_OUTOF10: 10 - WORST POSSIBLE PAIN
PAINLEVEL_OUTOF10: 3

## 2024-03-18 ASSESSMENT — PAIN - FUNCTIONAL ASSESSMENT
PAIN_FUNCTIONAL_ASSESSMENT: 0-10

## 2024-03-18 ASSESSMENT — PAIN DESCRIPTION - DESCRIPTORS: DESCRIPTORS: THROBBING;BURNING

## 2024-03-18 NOTE — PROGRESS NOTES
Wound Care Progress Note     Visit Date: 3/18/2024      Patient Name: Susan Farfan         MRN: [id number]             Reason for Visit: wound VAC dressing change     Wound History: NSTI s/p debridement     Wound Assessment:  Wound 03/11/24 Soft Tissue Necrosis Groin (Active)   Date First Assessed/Time First Assessed: 03/11/24 1930   Primary Wound Type: Soft Tissue Necrosis  Location: Groin      Assessments 3/18/2024  4:19 PM   Wound Image     Site Assessment Red;Pink;Granulation   Non-staged Wound Description Full thickness   State of Healing Early/partial granulation   Margins Attached edges;Well-defined edges   Drainage Description Serosanguineous   Drainage Amount Moderate   Dressing Vacuum dressing   Dressing Changed Changed   Dressing Status Clean;Dry       Inactive Orders   Date Order Priority Status Authorizing Provider   03/14/24 0849 Inpatient Consult to Wound and Ostomy Nurse Routine Completed Jasmin Aguilar PA-C     - Reason for Consult?:    Wound   03/12/24 0924 Wound care Routine Discontinued Chaitanya Izaguirre APRN-CNP     - Wound Complexity:    Simple     Wound 03/11/24 Soft Tissue Necrosis Groin (Active)   Date First Assessed/Time First Assessed: 03/11/24 1930   Primary Wound Type: Soft Tissue Necrosis  Location: Groin   Number of days: 6     Wound 03/11/24 Soft Tissue Necrosis Groin (Active)   Wound Image   03/18/24 1619   Site Assessment Red;Pink;Granulation 03/18/24 1619   Nicolle-Wound Assessment Clean;Dry 03/16/24 1945   Non-staged Wound Description Full thickness 03/18/24 1619   Shape linear 03/14/24 0741   State of Healing Early/partial granulation 03/18/24 1619   Margins Attached edges;Well-defined edges 03/18/24 1619   Drainage Description Serosanguineous 03/18/24 1619   Drainage Amount Moderate 03/18/24 1619   Dressing Vacuum dressing 03/18/24 1619   Dressing Changed Changed 03/18/24 1619   Dressing Status Clean;Dry 03/18/24 1619     Negative Pressure Wound Therapy Groin (Active)    Placement Date/Time: 03/15/24 1100   Location: Groin   Number of days: 3     Negative Pressure Wound Therapy Groin (Active)   Dressing Type Black foam 03/18/24 1620   Number of Foam Pieces Used 2 03/18/24 1620   Cycle Continuous 03/18/24 1620   Target Pressure (mmHg) 125 03/18/24 1620   Canister Changed No 03/18/24 1620   Output (mL) 0 mL 03/16/24 0900     Assessment/Intervention: Uneventful VAC dressing change to R groin wound. Wound appears clean, red/pink and granular. No odor or signs of infection. Please see new photo in chart. Cleaned, prepped. Redressed with 2 piece of black foam sealed with drape. Therapy resumed at -125mmHg. Pt tolerated reasonably well.     Wound Team Plan: Continue 2x/wk VAC dressing change. Home VAC at discharge (unless going to facility).      Indigo Mercedes RN, CWON  3/18/2024  4:20 PM

## 2024-03-18 NOTE — PROGRESS NOTES
[facility]  ACUTE CARE SURGERY - PROGRESS NOTE    Patient Name: Susan Farfan  MRN: [id number]  Admit Date: 311  : 1965  AGE: 58 y.o.   GENDER: female  ==============================================================================  TODAY'S ASSESSMENT AND PLAN OF CARE:  Susan Farfan is a 58F PMHx smoking, BG elevations, presents as a transfer for NSTI of perineum s/p debridement (3/12/24). Patient was brought to ICU for desaturation, improving currently 6L on Venti-mask, otherwise hemodynamically stable. HgbA1C was 7.0. Adjusting medications/nursing orders per patient's wishes. 3/14 wound vac placed.    NEURO/PAIN:  -PO tylenol  -PCA dilaudid, oxy prn     RESPIRATORY:  -IS     GI: HgbA1C 7  -regular diet     :   -no concerns     MSK:   -OOB as tolerated  -PT evaluation     HEMATOLOGIC:   -monitor H&H      ENDOCRINE:   -stop POCT and insulin      INFECTIOUS DISEASE:   -cipro/flagyl (3/15- 3/21)  -Bcx NGTD x4 and Intra-op Cx 3/12 with mixed anaerobic bacteria  -Wound vac change 3/18     Lines: Peripheral IV  DVT PROPHYLAXIS: lovenox , SCDs  Dispo: Patient amenable to PT/OT tomorrow for SNF placement     Ismael Hernández MD  PGY1  General Surgery  Acute Care Surgery, Pager [phone]    ==============================================================================  CHIEF COMPLAINT / EVENTS LAST 24HRS / HPI:  No new concerns overnight. Patient amenable to Wvac change. Per TCC, patient amenable to PT evaluation    MEDICAL HISTORY / ROS:   Admission history and ROS reviewed. Pertinent changes as follows:  None    PHYSICAL EXAM:  Heart Rate:  [69-77]   Temp:  [36.3 °C (97.3 °F)-36.5 °C (97.7 °F)]   Resp:  [16-18]   BP: (154-166)/(72-94)   SpO2:  [93 %-94 %]   Physical Exam  Constitutional - sitting in bed, NAD  Cards- RRR on tele  Resp- nonlabored breathing   Extremities- [place]   Skin- warm, dry   : voiding  Wound: wound bed pink, red with some bloody drainage  Neuro- alert and  oriented x3   Psych- appropriate mood     IMAGING SUMMARY:  (summary of new imaging findings, not a copy of dictation)  none    LABS:  Results from last 7 days   Lab Units 03/16/24  0505 03/15/24  0559 03/14/24  0630 03/12/24  0533   WBC AUTO x10*3/uL 9.0 9.2 8.1 13.1*   HEMOGLOBIN g/dL 12.7 13.5 12.8 13.3   HEMATOCRIT % 40.1 42.0 38.5 42.8   PLATELETS AUTO x10*3/uL 281 281 260 277   NEUTROS PCT AUTO % 65.4 65.6  --  92.4   LYMPHS PCT AUTO % 22.1 23.7  --  5.3   MONOS PCT AUTO % 8.8 8.4  --  1.4   EOS PCT AUTO % 2.7 1.6  --  0.1     Results from last 7 days   Lab Units 03/12/24  0533   APTT seconds 23*   INR  1.1     Results from last 7 days   Lab Units 03/16/24  0505 03/15/24  0559 03/14/24  0629 03/13/24  1632 03/11/24 1912   SODIUM mmol/L 138 138 138 136 137   POTASSIUM mmol/L 4.2 3.8 4.3 4.2 4.7   CHLORIDE mmol/L 101 99 101 100 101   CO2 mmol/L 29 27 29 29 24   BUN mg/dL 17 12 14 11 13   CREATININE mg/dL 0.86 0.69 0.78 0.77 0.90   CALCIUM mg/dL 9.2 9.3 8.8 8.7 9.4   PROTEIN TOTAL g/dL  --   --   --  6.8 8.0*   BILIRUBIN TOTAL mg/dL  --   --   --  0.4 0.4   ALK PHOS U/L  --   --   --  89 133*   ALT U/L  --   --   --  10 17   AST U/L  --   --   --  10 16   GLUCOSE mg/dL 121* 100* 119* 96 117*     Results from last 7 days   Lab Units 03/13/24  1632 03/11/24 1912   BILIRUBIN TOTAL mg/dL 0.4 0.4           I have reviewed all medications, laboratory results, and imaging pertinent for today's encounter.

## 2024-03-18 NOTE — PROGRESS NOTES
Physical Therapy    Physical Therapy Evaluation    Patient Name: Susan Farfan  MRN: [id number]  Today's Date: 3/18/2024   Time Calculation  Start Time: 1348  Stop Time: 1408  Time Calculation (min): 20 min    Assessment/Plan   PT Assessment  PT Assessment Results: Impaired balance, Decreased mobility  Rehab Prognosis: Good  Barriers to Discharge: home set up/ help available; pain  Evaluation/Treatment Tolerance: Patient limited by pain  Strengths: Attitude of self  End of Session Communication: Bedside nurse  Assessment Comment: Pt limited during session due to pain. Pt able to amb to bathroom 10ft x 2 with CGA; Pt with decreased safety awareness and impulsive during session. Patient would benefit from skilled physical therapy to maximize functional mobility and safety. Pt is appropriate for mod intensity therapy when medically appropriate for DC with the potential to progress.   End of Session Patient Position: Bed, 3 rail up, Alarm off, not on at start of session (CB in reach; pt sitting EOB)  IP OR SWING BED PT PLAN  Inpatient or Swing Bed: Inpatient  PT Plan  Treatment/Interventions: Bed mobility, Transfer training, Gait training, Stair training, Balance training, Endurance training, Therapeutic exercise, Therapeutic activity  PT Plan: Skilled PT  PT Frequency: 3 times per week  PT Discharge Recommendations: Moderate intensity level of continued care (with potential to progress low)  Equipment Recommended upon Discharge:  (to be determined)  PT Recommended Transfer Status: Contact guard  PT - OK to Discharge: Yes (meaning pt seen and DC rec made)    Subjective   General Visit Information:  General  Reason for Referral: OSH after 1.5 weeks of boil that began draining and developed into a soft tissue infection s/p debridement (3/12/24).  Past Medical History Relevant to Rehab: smoking and BG elevations  Prior to Session Communication: Bedside nurse  Patient Position Received: Bed, 3 rail up, Alarm off, not on at  start of session  Preferred Learning Style: verbal, auditory  General Comment: Pt stating they just changed wound vac and in a lot of pain. Pt agreeable to therapy with encouragement  Home Living:  Home Living  Type of Home: Mobile home  Lives With: Siblings (brother)  Home Adaptive Equipment: None  Home Layout: One level  Home Access: Stairs to enter with rails  Entrance Stairs-Rails: Both  Entrance Stairs-Number of Steps: 5  Bathroom Shower/Tub: Tub/shower unit  Bathroom Equipment: None  Home Living Comments: pt lives with brother who she states is unable to assist her at home; Pt states she has no other help available  Prior Level of Function:  Prior Function Per Pt/Caregiver Report  Level of [place]: Independent with ADLs and functional transfers, Independent with homemaking with ambulation  ADL Assistance: Independent  Homemaking Assistance: Independent  Ambulatory Assistance: Independent  Vocational: Full time employment  Prior Function Comments: Pt was IND in all ADLs and IADLs prior to admit and used no AD  Precautions:  Precautions  Medical Precautions: Fall precautions    Objective   Pain:  Pain Assessment  Pain Assessment: 0-10  Pain Score: 10 - Worst possible pain  Pain Type: Acute pain  Pain Location: Groin  Pain Interventions: Repositioned, Ambulation/increased activity (RN aware)  Response to Interventions: no change  Cognition:  Cognition  Overall Cognitive Status: Within Functional Limits  Arousal/Alertness: Appropriate responses to stimuli  Orientation Level: Oriented X4  Impulsive: Moderately    General Assessments:  Activity Tolerance  Endurance: Tolerates 10 - 20 min exercise with multiple rests    Sensation  Light Touch: No apparent deficits    Static Sitting Balance  Static Sitting-Balance Support: Bilateral upper extremity supported, Feet supported  Static Sitting-Level of Assistance: Modified independent  Static Sitting-Comment/Number of Minutes: x6 min    Static Standing  Balance  Static Standing-Balance Support: No upper extremity supported (pushing IV pole)  Static Standing-Level of Assistance: Contact guard  Functional Assessments:  Bed Mobility  Bed Mobility: Yes  Bed Mobility 1  Bed Mobility 1: Supine to sitting  Level of Assistance 1: Close supervision  Bed Mobility Comments 1: pt sating she does not need assist    Transfers  Transfer: Yes  Transfer 1  Transfer From 1: Sit to, Stand to  Transfer to 1: Stand, Sit  Technique 1: Sit to stand, Stand to sit  Transfer Device 1:  (none)  Transfer Level of Assistance 1: Contact guard  Trials/Comments 1: x 2; mod vc for safety    Ambulation/Gait Training  Ambulation/Gait Training Performed: Yes  Ambulation/Gait Training 1  Surface 1: Level tile  Device 1: IV Pole  Assistance 1: Contact guard  Quality of Gait 1: Diminished heel strike, Inconsistent stride length, Foot slap, Shuffling gait, Antalgic  Comments/Distance (ft) 1: 10ft x 2 to bathroom; Pt impulsive and needed mod vc for safety; pt declined further ambulation  Extremity/Trunk Assessments:  RLE   RLE : Exceptions to WFL  Strength RLE  RLE Overall Strength: Greater than or equal to 3/5 as evidenced by functional mobility  LLE   LLE : Exceptions to WFL  Strength LLE  LLE Overall Strength: Greater than or equal to 3/5 as evidenced by functional mobility  Outcome Measures:  [facility] Basic Mobility  Turning from your back to your side while in a flat bed without using bedrails: None  Moving from lying on your back to sitting on the side of a flat bed without using bedrails: None  Moving to and from bed to chair (including a wheelchair): A little  Standing up from a chair using your arms (e.g. wheelchair or bedside chair): A little  To walk in hospital room: A little  Climbing 3-5 steps with railing: Total  Basic Mobility - Total Score: 18    Encounter Problems       Encounter Problems (Active)       PT Problem       Patient will complete supine to sit and sit to supine Independent         Start:  03/18/24    Expected End:  04/01/24            Patient will ambulate >100' with No Device and Independent        Start:  03/18/24    Expected End:  04/01/24            Patient will ascend/descend 5 steps with Bilateral Rails and independence        Start:  03/18/24    Expected End:  04/01/24            Patient will perform sit<>stand transfer with No Device, and Independent        Start:  03/18/24    Expected End:  04/01/24               Pain - Adult              Education Documentation  Body Mechanics, taught by Yandy Siu, PT at 3/18/2024  2:52 PM.  Learner: Patient  Readiness: Acceptance  Method: Explanation, Demonstration  Response: Verbalizes Understanding, Needs Reinforcement    Home Exercise Program, taught by Yandy Siu PT at 3/18/2024  2:52 PM.  Learner: Patient  Readiness: Acceptance  Method: Explanation, Demonstration  Response: Verbalizes Understanding, Needs Reinforcement    Mobility Training, taught by Yandy Siu, PT at 3/18/2024  2:52 PM.  Learner: Patient  Readiness: Acceptance  Method: Explanation, Demonstration  Response: Verbalizes Understanding, Needs Reinforcement    Education Comments  No comments found.

## 2024-03-19 PROCEDURE — 99232 SBSQ HOSP IP/OBS MODERATE 35: CPT | Performed by: NURSE PRACTITIONER

## 2024-03-19 PROCEDURE — 2500000004 HC RX 250 GENERAL PHARMACY W/ HCPCS (ALT 636 FOR OP/ED): Performed by: PHYSICIAN ASSISTANT

## 2024-03-19 PROCEDURE — 1100000001 HC PRIVATE ROOM DAILY

## 2024-03-19 PROCEDURE — 2500000001 HC RX 250 WO HCPCS SELF ADMINISTERED DRUGS (ALT 637 FOR MEDICARE OP)

## 2024-03-19 PROCEDURE — 2500000001 HC RX 250 WO HCPCS SELF ADMINISTERED DRUGS (ALT 637 FOR MEDICARE OP): Performed by: NURSE PRACTITIONER

## 2024-03-19 RX ORDER — OXYCODONE HYDROCHLORIDE 5 MG/1
10 TABLET ORAL EVERY 4 HOURS PRN
Status: DISCONTINUED | OUTPATIENT
Start: 2024-03-19 | End: 2024-03-21

## 2024-03-19 RX ADMIN — METRONIDAZOLE 500 MG: 500 TABLET ORAL at 10:22

## 2024-03-19 RX ADMIN — ONDANSETRON 4 MG: 2 INJECTION INTRAMUSCULAR; INTRAVENOUS at 10:25

## 2024-03-19 RX ADMIN — ACETAMINOPHEN 975 MG: 325 TABLET ORAL at 13:11

## 2024-03-19 RX ADMIN — ONDANSETRON 4 MG: 2 INJECTION INTRAMUSCULAR; INTRAVENOUS at 20:24

## 2024-03-19 RX ADMIN — OXYCODONE HYDROCHLORIDE 10 MG: 5 TABLET ORAL at 10:23

## 2024-03-19 RX ADMIN — ACETAMINOPHEN 975 MG: 325 TABLET ORAL at 20:24

## 2024-03-19 RX ADMIN — CIPROFLOXACIN 750 MG: 750 TABLET, FILM COATED ORAL at 10:22

## 2024-03-19 RX ADMIN — CIPROFLOXACIN 750 MG: 750 TABLET, FILM COATED ORAL at 20:23

## 2024-03-19 RX ADMIN — METRONIDAZOLE 500 MG: 500 TABLET ORAL at 05:09

## 2024-03-19 RX ADMIN — OXYCODONE HYDROCHLORIDE 10 MG: 5 TABLET ORAL at 16:23

## 2024-03-19 RX ADMIN — METRONIDAZOLE 500 MG: 500 TABLET ORAL at 17:52

## 2024-03-19 RX ADMIN — ACETAMINOPHEN 975 MG: 325 TABLET ORAL at 05:09

## 2024-03-19 RX ADMIN — OXYCODONE HYDROCHLORIDE 10 MG: 5 TABLET ORAL at 20:23

## 2024-03-19 ASSESSMENT — COGNITIVE AND FUNCTIONAL STATUS - GENERAL
MOBILITY SCORE: 24
DAILY ACTIVITIY SCORE: 24

## 2024-03-19 ASSESSMENT — PAIN - FUNCTIONAL ASSESSMENT
PAIN_FUNCTIONAL_ASSESSMENT: 0-10

## 2024-03-19 ASSESSMENT — PAIN DESCRIPTION - LOCATION
LOCATION: ABDOMEN
LOCATION: ABDOMEN

## 2024-03-19 ASSESSMENT — PAIN SCALES - GENERAL
PAINLEVEL_OUTOF10: 10 - WORST POSSIBLE PAIN
PAINLEVEL_OUTOF10: 10 - WORST POSSIBLE PAIN
PAINLEVEL_OUTOF10: 8
PAINLEVEL_OUTOF10: 10 - WORST POSSIBLE PAIN
PAINLEVEL_OUTOF10: 5 - MODERATE PAIN

## 2024-03-19 NOTE — CARE PLAN
The patient's goals for the shift include      The clinical goals for the shift include Patient will remain safe and free from falls      Problem: Pain  Goal: Takes deep breaths with improved pain control throughout the shift  Outcome: Progressing  Goal: Turns in bed with improved pain control throughout the shift  Outcome: Progressing  Goal: Walks with improved pain control throughout the shift  Outcome: Progressing  Goal: Performs ADL's with improved pain control throughout shift  Outcome: Progressing  Goal: Participates in PT with improved pain control throughout the shift  Outcome: Progressing  Goal: Free from opioid side effects throughout the shift  Outcome: Progressing  Goal: Free from acute confusion related to pain meds throughout the shift  Outcome: Progressing     Problem: Skin  Goal: Decreased wound size/increased tissue granulation at next dressing change  Outcome: Progressing  Flowsheets (Taken 3/19/2024 0611)  Decreased wound size/increased tissue granulation at next dressing change: Promote sleep for wound healing  Goal: Participates in plan/prevention/treatment measures  Outcome: Progressing  Goal: Prevent/manage excess moisture  Outcome: Progressing  Goal: Prevent/minimize sheer/friction injuries  Outcome: Progressing  Goal: Promote/optimize nutrition  Outcome: Progressing  Goal: Promote skin healing  Outcome: Progressing     Problem: Pain - Adult  Goal: Verbalizes/displays adequate comfort level or baseline comfort level  Outcome: Progressing     Problem: Safety - Adult  Goal: Free from fall injury  Outcome: Progressing     Problem: Discharge Planning  Goal: Discharge to home or other facility with appropriate resources  Outcome: Progressing     Problem: Chronic Conditions and Co-morbidities  Goal: Patient's chronic conditions and co-morbidity symptoms are monitored and maintained or improved  Outcome: Progressing

## 2024-03-19 NOTE — PROGRESS NOTES
"[facility]  ACUTE CARE SURGERY - PROGRESS NOTE    Patient Name: Susan Farfan  MRN: [id number]  Admit Date: 311  : 1965  AGE: 58 y.o.   GENDER: female  ==============================================================================  TODAY'S ASSESSMENT AND PLAN OF CARE:  Susan Farfan is a 58F PMHx smoking, BG elevations, presents as a transfer for NSTI of perineum s/p debridement (3/12/24). Patient was brought to ICU for desaturation, improving currently 6L on Venti-mask, otherwise hemodynamically stable. HgbA1C was 7.0. Adjusting medications/nursing orders per patient's wishes. 3/14 wound vac placed.    NEURO/PAIN:  -PO tylenol  -Discontinue Tylenol, Oxycodone 5/10mg PRN   -IV Dilaudid for breakthrough      RESPIRATORY:  -IS     GI: HgbA1C 7  -regular diet     :   -no concerns     MSK:   -OOB as tolerated  -PT evaluation- rec SNF; patient decision to go home      HEMATOLOGIC:   -monitor H&H      ENDOCRINE:   -stop POCT and insulin as patient is refusing      INFECTIOUS DISEASE:   -cipro/flagyl (3/15- 3/21)  -Bcx NGTD x4 and Intra-op Cx 3/12 with mixed anaerobic bacteria  -Will change wound vac 3/20 with PO pain meds      Lines: Peripheral IV  DVT PROPHYLAXIS: lovenox , SCDs    Dispo: PT recs SNF, however patient would like to go home. Discussed in depth home with home care for wound dressing changes. Patient refusing home care and would like to be set up with an outpatient wound care center. Explained to patient that she will need transport as she cannot take pain meds prior to driving. Patient states she understands but \"I will do what I want when I leave here\" Explained risks of driving while on narcotics.  Will plan to discharge home on 3/20 if can tolerate wound vac dressing change with PO pain meds and home vac comes to bedside     Chaitanya HUGGINS-CNP  Acute Care Surgery, Pager " [phone]    ==============================================================================  CHIEF COMPLAINT / EVENTS LAST 24HRS / HPI:  No new concerns overnight.    MEDICAL HISTORY / ROS:   Admission history and ROS reviewed. Pertinent changes as follows:  None    PHYSICAL EXAM:  Heart Rate:  [78-79]   Temp:  [36.2 °C (97.2 °F)-36.4 °C (97.5 °F)]   Resp:  [16-18]   BP: (133-161)/(79-88)   SpO2:  [90 %-94 %]   Physical Exam  Constitutional - sitting in bed, NAD  Cards- non cyanotic   Resp- nonlabored breathing   Extremities- [place]   Skin- warm, dry   Wound: right groin wound vac in place and holding suction   Neuro- alert and oriented x3   Psych- appropriate mood     IMAGING SUMMARY:  (summary of new imaging findings, not a copy of dictation)  none    LABS:  Results from last 7 days   Lab Units 03/16/24  0505 03/15/24  0559 03/14/24  0630   WBC AUTO x10*3/uL 9.0 9.2 8.1   HEMOGLOBIN g/dL 12.7 13.5 12.8   HEMATOCRIT % 40.1 42.0 38.5   PLATELETS AUTO x10*3/uL 281 281 260   NEUTROS PCT AUTO % 65.4 65.6  --    LYMPHS PCT AUTO % 22.1 23.7  --    MONOS PCT AUTO % 8.8 8.4  --    EOS PCT AUTO % 2.7 1.6  --              Results from last 7 days   Lab Units 03/16/24  0505 03/15/24  0559 03/14/24  0629 03/13/24  1632   SODIUM mmol/L 138 138 138 136   POTASSIUM mmol/L 4.2 3.8 4.3 4.2   CHLORIDE mmol/L 101 99 101 100   CO2 mmol/L 29 27 29 29   BUN mg/dL 17 12 14 11   CREATININE mg/dL 0.86 0.69 0.78 0.77   CALCIUM mg/dL 9.2 9.3 8.8 8.7   PROTEIN TOTAL g/dL  --   --   --  6.8   BILIRUBIN TOTAL mg/dL  --   --   --  0.4   ALK PHOS U/L  --   --   --  89   ALT U/L  --   --   --  10   AST U/L  --   --   --  10   GLUCOSE mg/dL 121* 100* 119* 96       Results from last 7 days   Lab Units 03/13/24  1632   BILIRUBIN TOTAL mg/dL 0.4             I have reviewed all medications, laboratory results, and imaging pertinent for today's encounter.

## 2024-03-20 ENCOUNTER — PHARMACY VISIT (OUTPATIENT)
Dept: PHARMACY | Facility: CLINIC | Age: 59
End: 2024-03-20
Payer: MEDICAID

## 2024-03-20 PROCEDURE — RXMED WILLOW AMBULATORY MEDICATION CHARGE

## 2024-03-20 PROCEDURE — 2500000004 HC RX 250 GENERAL PHARMACY W/ HCPCS (ALT 636 FOR OP/ED): Performed by: PHYSICIAN ASSISTANT

## 2024-03-20 PROCEDURE — 1100000001 HC PRIVATE ROOM DAILY

## 2024-03-20 PROCEDURE — 2500000001 HC RX 250 WO HCPCS SELF ADMINISTERED DRUGS (ALT 637 FOR MEDICARE OP)

## 2024-03-20 PROCEDURE — 2500000001 HC RX 250 WO HCPCS SELF ADMINISTERED DRUGS (ALT 637 FOR MEDICARE OP): Performed by: NURSE PRACTITIONER

## 2024-03-20 RX ORDER — METRONIDAZOLE 500 MG/1
500 TABLET ORAL EVERY 8 HOURS SCHEDULED
Qty: 4 TABLET | Refills: 0 | Status: SHIPPED | OUTPATIENT
Start: 2024-03-20 | End: 2024-03-27 | Stop reason: HOSPADM

## 2024-03-20 RX ORDER — OXYCODONE HYDROCHLORIDE 10 MG/1
10 TABLET ORAL EVERY 4 HOURS PRN
Qty: 15 TABLET | Refills: 0 | Status: SHIPPED | OUTPATIENT
Start: 2024-03-20 | End: 2024-03-25

## 2024-03-20 RX ORDER — POLYETHYLENE GLYCOL 3350 17 G/17G
17 POWDER, FOR SOLUTION ORAL DAILY
Qty: 30 PACKET | Refills: 0 | Status: SHIPPED | OUTPATIENT
Start: 2024-03-21 | End: 2024-04-20

## 2024-03-20 RX ORDER — CIPROFLOXACIN 750 MG/1
750 TABLET, FILM COATED ORAL EVERY 12 HOURS SCHEDULED
Qty: 3 TABLET | Refills: 0 | Status: SHIPPED | OUTPATIENT
Start: 2024-03-20 | End: 2024-03-27 | Stop reason: HOSPADM

## 2024-03-20 RX ADMIN — ACETAMINOPHEN 975 MG: 325 TABLET ORAL at 05:58

## 2024-03-20 RX ADMIN — OXYCODONE HYDROCHLORIDE 10 MG: 5 TABLET ORAL at 15:43

## 2024-03-20 RX ADMIN — ACETAMINOPHEN 975 MG: 325 TABLET ORAL at 13:48

## 2024-03-20 RX ADMIN — METRONIDAZOLE 500 MG: 500 TABLET ORAL at 10:22

## 2024-03-20 RX ADMIN — ACETAMINOPHEN 975 MG: 325 TABLET ORAL at 20:52

## 2024-03-20 RX ADMIN — OXYCODONE HYDROCHLORIDE 10 MG: 5 TABLET ORAL at 20:51

## 2024-03-20 RX ADMIN — METRONIDAZOLE 500 MG: 500 TABLET ORAL at 19:00

## 2024-03-20 RX ADMIN — CIPROFLOXACIN 750 MG: 750 TABLET, FILM COATED ORAL at 20:52

## 2024-03-20 RX ADMIN — ONDANSETRON 4 MG: 2 INJECTION INTRAMUSCULAR; INTRAVENOUS at 06:41

## 2024-03-20 RX ADMIN — CIPROFLOXACIN 750 MG: 750 TABLET, FILM COATED ORAL at 08:19

## 2024-03-20 RX ADMIN — OXYCODONE HYDROCHLORIDE 10 MG: 5 TABLET ORAL at 10:22

## 2024-03-20 RX ADMIN — OXYCODONE HYDROCHLORIDE 10 MG: 5 TABLET ORAL at 05:57

## 2024-03-20 RX ADMIN — METRONIDAZOLE 500 MG: 500 TABLET ORAL at 05:58

## 2024-03-20 ASSESSMENT — COGNITIVE AND FUNCTIONAL STATUS - GENERAL
MOBILITY SCORE: 24
MOBILITY SCORE: 24
DAILY ACTIVITIY SCORE: 24
DAILY ACTIVITIY SCORE: 24

## 2024-03-20 ASSESSMENT — PAIN SCALES - GENERAL
PAINLEVEL_OUTOF10: 9
PAINLEVEL_OUTOF10: 7
PAINLEVEL_OUTOF10: 10 - WORST POSSIBLE PAIN
PAINLEVEL_OUTOF10: 7
PAINLEVEL_OUTOF10: 3
PAINLEVEL_OUTOF10: 9
PAINLEVEL_OUTOF10: 7
PAINLEVEL_OUTOF10: 4

## 2024-03-20 ASSESSMENT — PAIN - FUNCTIONAL ASSESSMENT
PAIN_FUNCTIONAL_ASSESSMENT: 0-10

## 2024-03-20 ASSESSMENT — PAIN DESCRIPTION - LOCATION
LOCATION: GROIN
LOCATION: ABDOMEN

## 2024-03-20 NOTE — PROGRESS NOTES
"   Physical Therapy    Therapy Communication Note    Patient Name: Susan Farfan  MRN: [id number]  Today's Date: 3/20/2024     Discipline: Physical Therapy      Missed Visit: Yes  Missed Visit Reason:  (Pt adamantly refused PT despite encouragement. Stated \"I can walk just fine\". Pt stated is not interested in PT services. Will d/c PT order.)      03/20/24 at 11:59 AM   Danielle Viera, PT     "

## 2024-03-20 NOTE — PROGRESS NOTES
"[facility]  ACUTE CARE SURGERY - PROGRESS NOTE    Patient Name: Susan Farfan  MRN: [id number]  Admit Date: 311  : 1965  AGE: 58 y.o.   GENDER: female  ==============================================================================  TODAY'S ASSESSMENT AND PLAN OF CARE:  Susan Farfan is a 58F PMHx smoking, BG elevations, presents as a transfer for NSTI of perineum s/p debridement (3/12/24). Patient was brought to ICU for desaturation, improving currently 6L on Venti-mask, otherwise hemodynamically stable. HgbA1C was 7.0. Adjusting medications/nursing orders per patient's wishes. 3/14 wound vac placed, changed 3/18.     NEURO/PAIN:  -scheduled tylenol q8  -IV Dilaudid and oxy PRN for pain      RESPIRATORY:  -IS     GI: HgbA1C 7  -regular diet  -miralax and pericolace scheduled      :   -no concerns     MSK:   -OOB as tolerated  -PT evaluation- rec SNF; patient decision to go home      HEMATOLOGIC:   -monitor H&H      ENDOCRINE:   -no POCT and insulin as patient is refusing      INFECTIOUS DISEASE:   -cipro/flagyl (3/15- 3/21)  -Bcx NGTD x4 and Intra-op Cx 3/12 with mixed anaerobic bacteria  -wound vac changes (last changed 3/18)     Lines: Peripheral IV  DVT PROPHYLAXIS: lovenox , SCDs     Dispo:   In convo on 3/19, PT rec'ed SNF, however patient would like to go home. Discussed in depth home with home care for wound dressing changes. Patient refusing home care and would like to be set up with an outpatient wound care center. Explained to patient that she will need transport as she cannot take pain meds prior to driving. Patient states she understands but \"I will do what I want when I leave here\" Explained risks of driving while on narcotics.      Pt discharge pending home vac approval     ==============================================================================  CHIEF COMPLAINT / EVENTS LAST 24HRS / HPI:  No new concerns overnight     MEDICAL HISTORY / ROS: "   Admission history and ROS reviewed. Pertinent changes as follows:      PHYSICAL EXAM:  Heart Rate:  [73-90]   Temp:  [35.9 °C (96.6 °F)-36.5 °C (97.7 °F)]   Resp:  [16]   BP: (133-152)/(69-92)   SpO2:  [92 %-95 %]   Physical Exam  Constitutional - sitting in bed, NAD  Cards- non cyanotic   Resp- nonlabored breathing   Extremities- [place]   Skin- warm, dry   Wound: right groin wound vac in place and holding suction   Neuro- alert and oriented x3   Psych- appropriate mood      IMAGING SUMMARY:  (summary of new imaging findings, not a copy of dictation)  noone    LABS:  Results from last 7 days   Lab Units 03/16/24  0505 03/15/24  0559 03/14/24  0630   WBC AUTO x10*3/uL 9.0 9.2 8.1   HEMOGLOBIN g/dL 12.7 13.5 12.8   HEMATOCRIT % 40.1 42.0 38.5   PLATELETS AUTO x10*3/uL 281 281 260   NEUTROS PCT AUTO % 65.4 65.6  --    LYMPHS PCT AUTO % 22.1 23.7  --    MONOS PCT AUTO % 8.8 8.4  --    EOS PCT AUTO % 2.7 1.6  --          Results from last 7 days   Lab Units 03/16/24  0505 03/15/24  0559 03/14/24  0629 03/13/24  1632   SODIUM mmol/L 138 138 138 136   POTASSIUM mmol/L 4.2 3.8 4.3 4.2   CHLORIDE mmol/L 101 99 101 100   CO2 mmol/L 29 27 29 29   BUN mg/dL 17 12 14 11   CREATININE mg/dL 0.86 0.69 0.78 0.77   CALCIUM mg/dL 9.2 9.3 8.8 8.7   PROTEIN TOTAL g/dL  --   --   --  6.8   BILIRUBIN TOTAL mg/dL  --   --   --  0.4   ALK PHOS U/L  --   --   --  89   ALT U/L  --   --   --  10   AST U/L  --   --   --  10   GLUCOSE mg/dL 121* 100* 119* 96     Results from last 7 days   Lab Units 03/13/24  1632   BILIRUBIN TOTAL mg/dL 0.4           I have reviewed all medications, laboratory results, and imaging pertinent for today's encounter.

## 2024-03-20 NOTE — CARE PLAN
The patient's goals for the shift include      The clinical goals for the shift include pt will remain safe in hospital      Problem: Pain  Goal: Takes deep breaths with improved pain control throughout the shift  Outcome: Progressing  Goal: Turns in bed with improved pain control throughout the shift  Outcome: Progressing  Goal: Walks with improved pain control throughout the shift  Outcome: Progressing  Goal: Performs ADL's with improved pain control throughout shift  Outcome: Progressing  Goal: Participates in PT with improved pain control throughout the shift  Outcome: Progressing  Goal: Free from opioid side effects throughout the shift  Outcome: Progressing  Goal: Free from acute confusion related to pain meds throughout the shift  Outcome: Progressing     Problem: Skin  Goal: Decreased wound size/increased tissue granulation at next dressing change  Outcome: Progressing  Flowsheets (Taken 3/20/2024 0047)  Decreased wound size/increased tissue granulation at next dressing change: Promote sleep for wound healing  Goal: Participates in plan/prevention/treatment measures  Outcome: Progressing  Goal: Prevent/manage excess moisture  Outcome: Progressing  Goal: Prevent/minimize sheer/friction injuries  Outcome: Progressing  Goal: Promote/optimize nutrition  Outcome: Progressing  Goal: Promote skin healing  Outcome: Progressing     Problem: Pain - Adult  Goal: Verbalizes/displays adequate comfort level or baseline comfort level  Outcome: Progressing     Problem: Safety - Adult  Goal: Free from fall injury  Outcome: Progressing     Problem: Discharge Planning  Goal: Discharge to home or other facility with appropriate resources  Outcome: Progressing     Problem: Chronic Conditions and Co-morbidities  Goal: Patient's chronic conditions and co-morbidity symptoms are monitored and maintained or improved  Outcome: Progressing

## 2024-03-21 PROCEDURE — 2500000001 HC RX 250 WO HCPCS SELF ADMINISTERED DRUGS (ALT 637 FOR MEDICARE OP): Performed by: PHYSICIAN ASSISTANT

## 2024-03-21 PROCEDURE — 99232 SBSQ HOSP IP/OBS MODERATE 35: CPT | Performed by: PHYSICIAN ASSISTANT

## 2024-03-21 PROCEDURE — 1100000001 HC PRIVATE ROOM DAILY

## 2024-03-21 PROCEDURE — 2500000001 HC RX 250 WO HCPCS SELF ADMINISTERED DRUGS (ALT 637 FOR MEDICARE OP): Performed by: NURSE PRACTITIONER

## 2024-03-21 PROCEDURE — 2500000001 HC RX 250 WO HCPCS SELF ADMINISTERED DRUGS (ALT 637 FOR MEDICARE OP)

## 2024-03-21 PROCEDURE — 2500000004 HC RX 250 GENERAL PHARMACY W/ HCPCS (ALT 636 FOR OP/ED): Performed by: PHYSICIAN ASSISTANT

## 2024-03-21 RX ORDER — HYDROMORPHONE HYDROCHLORIDE 1 MG/ML
0.2 INJECTION, SOLUTION INTRAMUSCULAR; INTRAVENOUS; SUBCUTANEOUS EVERY 6 HOURS PRN
Status: DISCONTINUED | OUTPATIENT
Start: 2024-03-21 | End: 2024-03-28

## 2024-03-21 RX ORDER — OXYCODONE HYDROCHLORIDE 5 MG/1
2.5 TABLET ORAL EVERY 4 HOURS PRN
Status: DISCONTINUED | OUTPATIENT
Start: 2024-03-21 | End: 2024-03-28 | Stop reason: HOSPADM

## 2024-03-21 RX ORDER — OXYCODONE HYDROCHLORIDE 5 MG/1
5 TABLET ORAL EVERY 4 HOURS PRN
Status: DISCONTINUED | OUTPATIENT
Start: 2024-03-21 | End: 2024-03-28 | Stop reason: HOSPADM

## 2024-03-21 RX ADMIN — ACETAMINOPHEN 975 MG: 325 TABLET ORAL at 04:02

## 2024-03-21 RX ADMIN — OXYCODONE HYDROCHLORIDE 5 MG: 5 TABLET ORAL at 19:57

## 2024-03-21 RX ADMIN — HYDROMORPHONE HYDROCHLORIDE 0.2 MG: 1 INJECTION, SOLUTION INTRAMUSCULAR; INTRAVENOUS; SUBCUTANEOUS at 12:08

## 2024-03-21 RX ADMIN — OXYCODONE HYDROCHLORIDE 10 MG: 5 TABLET ORAL at 04:02

## 2024-03-21 RX ADMIN — OXYCODONE HYDROCHLORIDE 5 MG: 5 TABLET ORAL at 15:10

## 2024-03-21 RX ADMIN — ACETAMINOPHEN 975 MG: 325 TABLET ORAL at 15:18

## 2024-03-21 RX ADMIN — METRONIDAZOLE 500 MG: 500 TABLET ORAL at 19:58

## 2024-03-21 RX ADMIN — METRONIDAZOLE 500 MG: 500 TABLET ORAL at 04:01

## 2024-03-21 RX ADMIN — ACETAMINOPHEN 975 MG: 325 TABLET ORAL at 20:51

## 2024-03-21 RX ADMIN — OXYCODONE HYDROCHLORIDE 5 MG: 5 TABLET ORAL at 23:57

## 2024-03-21 RX ADMIN — CIPROFLOXACIN 750 MG: 750 TABLET, FILM COATED ORAL at 23:56

## 2024-03-21 RX ADMIN — METRONIDAZOLE 500 MG: 500 TABLET ORAL at 12:12

## 2024-03-21 RX ADMIN — CIPROFLOXACIN 750 MG: 750 TABLET, FILM COATED ORAL at 12:12

## 2024-03-21 RX ADMIN — OXYCODONE HYDROCHLORIDE 5 MG: 5 TABLET ORAL at 09:25

## 2024-03-21 ASSESSMENT — PAIN - FUNCTIONAL ASSESSMENT
PAIN_FUNCTIONAL_ASSESSMENT: 0-10

## 2024-03-21 ASSESSMENT — PAIN SCALES - GENERAL
PAINLEVEL_OUTOF10: 10 - WORST POSSIBLE PAIN
PAINLEVEL_OUTOF10: 9
PAINLEVEL_OUTOF10: 0 - NO PAIN
PAINLEVEL_OUTOF10: 7
PAINLEVEL_OUTOF10: 9

## 2024-03-21 ASSESSMENT — COGNITIVE AND FUNCTIONAL STATUS - GENERAL
MOBILITY SCORE: 24
DAILY ACTIVITIY SCORE: 24

## 2024-03-21 ASSESSMENT — PAIN DESCRIPTION - LOCATION
LOCATION: GROIN
LOCATION: GROIN

## 2024-03-21 NOTE — CONSULTS
Wound Care Consult     Visit Date: 3/21/2024      Patient Name: Susan Farfan         MRN: [id number]           YOB: 1965     Reason for Consult: wound vac dressing change         Wound Assessment:  Wound 03/11/24 Soft Tissue Necrosis Groin (Active)   Wound Image   03/18/24 1619   Site Assessment Clean;Granulation;Painful;Red 03/21/24 1537   Nicolle-Wound Assessment Dry 03/21/24 1537   Non-staged Wound Description Full thickness 03/21/24 1537   Shape linear 03/14/24 0741   State of Healing Early/partial granulation 03/21/24 1537   Margins Attached edges 03/19/24 2024   Drainage Description Serosanguineous 03/21/24 1537   Drainage Amount Moderate 03/21/24 1537   Dressing Vacuum dressing 03/21/24 1537   Dressing Changed New 03/21/24 1537   Dressing Status Dry;Clean 03/20/24 2051       Wound Team Summary Assessment:   Patient premedicated with IV pain meds, tolerated dressing change at this time.  Wound Team Plan: Patient anticipates going home vs SNF. She will need home vac ordered and applied before discharge home.     Veronica Ambriz RN  3/21/2024  3:39 PM

## 2024-03-21 NOTE — PROGRESS NOTES
"[facility]  ACUTE CARE SURGERY - PROGRESS NOTE    Patient Name: Susan Farfan  MRN: [id number]  Admit Date: 311  : 1965  AGE: 58 y.o.   GENDER: female  ==============================================================================  TODAY'S ASSESSMENT AND PLAN OF CARE:  Susan Farfan is a 58F PMHx smoking, BG elevations, presents as a transfer for NSTI of perineum s/p debridement (3/12/24). Patient was brought to ICU for desaturation, improving currently 6L on Venti-mask, otherwise hemodynamically stable. HgbA1C was 7.0. Adjusting medications/nursing orders per patient's wishes. 3/14 wound vac placed, changed 3/18.     NEURO/PAIN:  -scheduled tylenol q8  -IV Dilaudid and oxy PRN for pain      RESPIRATORY:  -IS     GI: HgbA1C 7  -regular diet  -miralax and pericolace scheduled      :   -no concerns     MSK:   -OOB as tolerated  -PT evaluation- rec SNF; patient decision to go home      HEMATOLOGIC:   -monitor H&H      ENDOCRINE:   -no POCT and insulin as patient is refusing      INFECTIOUS DISEASE:   -cipro/flagyl (3/15- 3/21)  -Bcx NGTD x4 and Intra-op Cx 3/12 with mixed anaerobic bacteria  -wound vac changes (last changed 3/18)     Lines: Peripheral IV  DVT PROPHYLAXIS: lovenox , SCDs     Dispo:   In convo on 3/19, PT rec'ed SNF, however patient would like to go home. Discussed in depth home with home care for wound dressing changes. Patient refusing home care and would like to be set up with an outpatient wound care center. Explained to patient that she will need transport as she cannot take pain meds prior to driving. Patient states she understands but \"I will do what I want when I leave here\" Explained risks of driving while on narcotics.      3/21: pending home vac approval for dc home    Pt discharge pending home vac approval     ==============================================================================  CHIEF COMPLAINT / EVENTS LAST 24HRS / HPI:  No new concerns " "overnight     MEDICAL HISTORY / ROS:   Admission history and ROS reviewed. Pertinent changes as follows:      PHYSICAL EXAM:  Heart Rate:  [75-90]   Temp:  [35.9 °C (96.6 °F)-36.3 °C (97.3 °F)]   Resp:  [16-18]   BP: (135-154)/(68-94)   Height:  [165.1 cm (5' 5\")]   Weight:  [127 kg (281 lb)]   SpO2:  [92 %-95 %]   Physical Exam  Constitutional - sitting in bed, NAD  Cards- non cyanotic   Resp- nonlabored breathing   Extremities- NEWTON   Skin- warm, dry   Wound: right groin wound vac in place and holding suction   Neuro- alert and oriented x3   Psych- appropriate mood      IMAGING SUMMARY:  (summary of new imaging findings, not a copy of dictation)  noone    LABS:  Results from last 7 days   Lab Units 03/16/24  0505 03/15/24  0559   WBC AUTO x10*3/uL 9.0 9.2   HEMOGLOBIN g/dL 12.7 13.5   HEMATOCRIT % 40.1 42.0   PLATELETS AUTO x10*3/uL 281 281   NEUTROS PCT AUTO % 65.4 65.6   LYMPHS PCT AUTO % 22.1 23.7   MONOS PCT AUTO % 8.8 8.4   EOS PCT AUTO % 2.7 1.6           Results from last 7 days   Lab Units 03/16/24  0505 03/15/24  0559   SODIUM mmol/L 138 138   POTASSIUM mmol/L 4.2 3.8   CHLORIDE mmol/L 101 99   CO2 mmol/L 29 27   BUN mg/dL 17 12   CREATININE mg/dL 0.86 0.69   CALCIUM mg/dL 9.2 9.3   GLUCOSE mg/dL 121* 100*                   I have reviewed all medications, laboratory results, and imaging pertinent for today's encounter.    "

## 2024-03-22 ENCOUNTER — APPOINTMENT (OUTPATIENT)
Dept: WOUND CARE | Facility: HOSPITAL | Age: 59
End: 2024-03-22
Payer: MEDICAID

## 2024-03-22 PROCEDURE — 2500000001 HC RX 250 WO HCPCS SELF ADMINISTERED DRUGS (ALT 637 FOR MEDICARE OP): Performed by: PHYSICIAN ASSISTANT

## 2024-03-22 PROCEDURE — 1100000001 HC PRIVATE ROOM DAILY

## 2024-03-22 RX ADMIN — ACETAMINOPHEN 975 MG: 325 TABLET ORAL at 14:04

## 2024-03-22 RX ADMIN — OXYCODONE HYDROCHLORIDE 5 MG: 5 TABLET ORAL at 14:03

## 2024-03-22 RX ADMIN — OXYCODONE HYDROCHLORIDE 5 MG: 5 TABLET ORAL at 21:34

## 2024-03-22 RX ADMIN — ACETAMINOPHEN 975 MG: 325 TABLET ORAL at 05:33

## 2024-03-22 RX ADMIN — ACETAMINOPHEN 975 MG: 325 TABLET ORAL at 21:34

## 2024-03-22 RX ADMIN — OXYCODONE HYDROCHLORIDE 5 MG: 5 TABLET ORAL at 10:06

## 2024-03-22 RX ADMIN — OXYCODONE HYDROCHLORIDE 5 MG: 5 TABLET ORAL at 05:33

## 2024-03-22 ASSESSMENT — PAIN - FUNCTIONAL ASSESSMENT
PAIN_FUNCTIONAL_ASSESSMENT: 0-10

## 2024-03-22 ASSESSMENT — COGNITIVE AND FUNCTIONAL STATUS - GENERAL
MOBILITY SCORE: 24
MOBILITY SCORE: 24
DAILY ACTIVITIY SCORE: 24
DAILY ACTIVITIY SCORE: 24

## 2024-03-22 ASSESSMENT — PAIN DESCRIPTION - LOCATION: LOCATION: GROIN

## 2024-03-22 ASSESSMENT — PAIN SCALES - GENERAL
PAINLEVEL_OUTOF10: 9
PAINLEVEL_OUTOF10: 4
PAINLEVEL_OUTOF10: 6
PAINLEVEL_OUTOF10: 5 - MODERATE PAIN
PAINLEVEL_OUTOF10: 8
PAINLEVEL_OUTOF10: 9
PAINLEVEL_OUTOF10: 0 - NO PAIN
PAINLEVEL_OUTOF10: 6

## 2024-03-23 PROCEDURE — 1100000001 HC PRIVATE ROOM DAILY

## 2024-03-23 PROCEDURE — 2500000001 HC RX 250 WO HCPCS SELF ADMINISTERED DRUGS (ALT 637 FOR MEDICARE OP): Performed by: PHYSICIAN ASSISTANT

## 2024-03-23 PROCEDURE — 2500000001 HC RX 250 WO HCPCS SELF ADMINISTERED DRUGS (ALT 637 FOR MEDICARE OP): Performed by: NURSE PRACTITIONER

## 2024-03-23 RX ADMIN — SENNOSIDES AND DOCUSATE SODIUM 2 TABLET: 50; 8.6 TABLET ORAL at 21:21

## 2024-03-23 RX ADMIN — ACETAMINOPHEN 975 MG: 325 TABLET ORAL at 21:21

## 2024-03-23 RX ADMIN — OXYCODONE HYDROCHLORIDE 5 MG: 5 TABLET ORAL at 17:18

## 2024-03-23 RX ADMIN — OXYCODONE HYDROCHLORIDE 5 MG: 5 TABLET ORAL at 21:21

## 2024-03-23 RX ADMIN — OXYCODONE HYDROCHLORIDE 5 MG: 5 TABLET ORAL at 04:20

## 2024-03-23 RX ADMIN — OXYCODONE HYDROCHLORIDE 5 MG: 5 TABLET ORAL at 12:55

## 2024-03-23 RX ADMIN — OXYCODONE HYDROCHLORIDE 5 MG: 5 TABLET ORAL at 08:42

## 2024-03-23 RX ADMIN — ACETAMINOPHEN 975 MG: 325 TABLET ORAL at 12:55

## 2024-03-23 RX ADMIN — ACETAMINOPHEN 975 MG: 325 TABLET ORAL at 05:36

## 2024-03-23 ASSESSMENT — PAIN - FUNCTIONAL ASSESSMENT
PAIN_FUNCTIONAL_ASSESSMENT: 0-10

## 2024-03-23 ASSESSMENT — COGNITIVE AND FUNCTIONAL STATUS - GENERAL
DAILY ACTIVITIY SCORE: 24
MOBILITY SCORE: 24
MOBILITY SCORE: 24

## 2024-03-23 ASSESSMENT — PAIN SCALES - GENERAL
PAINLEVEL_OUTOF10: 6
PAINLEVEL_OUTOF10: 9
PAINLEVEL_OUTOF10: 9
PAINLEVEL_OUTOF10: 4
PAINLEVEL_OUTOF10: 7

## 2024-03-23 ASSESSMENT — PAIN DESCRIPTION - ORIENTATION: ORIENTATION: RIGHT

## 2024-03-23 ASSESSMENT — PAIN DESCRIPTION - LOCATION: LOCATION: GROIN

## 2024-03-23 NOTE — CARE PLAN
The patient's goals for the shift include      The clinical goals for the shift include maintain safety      Problem: Pain  Goal: Takes deep breaths with improved pain control throughout the shift  Outcome: Progressing  Goal: Turns in bed with improved pain control throughout the shift  Outcome: Progressing  Goal: Walks with improved pain control throughout the shift  Outcome: Progressing  Goal: Performs ADL's with improved pain control throughout shift  Outcome: Progressing  Goal: Participates in PT with improved pain control throughout the shift  Outcome: Progressing  Goal: Free from opioid side effects throughout the shift  Outcome: Progressing  Goal: Free from acute confusion related to pain meds throughout the shift  Outcome: Progressing     Problem: Skin  Goal: Decreased wound size/increased tissue granulation at next dressing change  Outcome: Progressing  Goal: Participates in plan/prevention/treatment measures  Outcome: Progressing  Goal: Prevent/manage excess moisture  Outcome: Progressing  Goal: Prevent/minimize sheer/friction injuries  Outcome: Progressing  Goal: Promote/optimize nutrition  Outcome: Progressing  Goal: Promote skin healing  Outcome: Progressing     Problem: Pain - Adult  Goal: Verbalizes/displays adequate comfort level or baseline comfort level  Outcome: Progressing     Problem: Safety - Adult  Goal: Free from fall injury  Outcome: Progressing     Problem: Discharge Planning  Goal: Discharge to home or other facility with appropriate resources  Outcome: Progressing     Problem: Chronic Conditions and Co-morbidities  Goal: Patient's chronic conditions and co-morbidity symptoms are monitored and maintained or improved  Outcome: Progressing

## 2024-03-24 PROCEDURE — 2500000004 HC RX 250 GENERAL PHARMACY W/ HCPCS (ALT 636 FOR OP/ED): Performed by: PHYSICIAN ASSISTANT

## 2024-03-24 PROCEDURE — 1100000001 HC PRIVATE ROOM DAILY

## 2024-03-24 PROCEDURE — 2500000001 HC RX 250 WO HCPCS SELF ADMINISTERED DRUGS (ALT 637 FOR MEDICARE OP): Performed by: PHYSICIAN ASSISTANT

## 2024-03-24 RX ORDER — ONDANSETRON HYDROCHLORIDE 2 MG/ML
4 INJECTION, SOLUTION INTRAVENOUS EVERY 6 HOURS PRN
Status: DISCONTINUED | OUTPATIENT
Start: 2024-03-24 | End: 2024-03-28 | Stop reason: HOSPADM

## 2024-03-24 RX ADMIN — OXYCODONE HYDROCHLORIDE 5 MG: 5 TABLET ORAL at 22:38

## 2024-03-24 RX ADMIN — OXYCODONE HYDROCHLORIDE 5 MG: 5 TABLET ORAL at 08:50

## 2024-03-24 RX ADMIN — OXYCODONE HYDROCHLORIDE 5 MG: 5 TABLET ORAL at 04:46

## 2024-03-24 RX ADMIN — OXYCODONE HYDROCHLORIDE 5 MG: 5 TABLET ORAL at 18:34

## 2024-03-24 RX ADMIN — HYDROMORPHONE HYDROCHLORIDE 0.2 MG: 1 INJECTION, SOLUTION INTRAMUSCULAR; INTRAVENOUS; SUBCUTANEOUS at 12:37

## 2024-03-24 RX ADMIN — ACETAMINOPHEN 975 MG: 325 TABLET ORAL at 04:46

## 2024-03-24 RX ADMIN — ACETAMINOPHEN 975 MG: 325 TABLET ORAL at 22:37

## 2024-03-24 RX ADMIN — ACETAMINOPHEN 975 MG: 325 TABLET ORAL at 14:23

## 2024-03-24 RX ADMIN — HYDROMORPHONE HYDROCHLORIDE 0.2 MG: 1 INJECTION, SOLUTION INTRAMUSCULAR; INTRAVENOUS; SUBCUTANEOUS at 20:46

## 2024-03-24 RX ADMIN — OXYCODONE HYDROCHLORIDE 5 MG: 5 TABLET ORAL at 14:24

## 2024-03-24 ASSESSMENT — PAIN DESCRIPTION - LOCATION
LOCATION: GROIN
LOCATION: VAGINA
LOCATION: GROIN

## 2024-03-24 ASSESSMENT — COGNITIVE AND FUNCTIONAL STATUS - GENERAL
MOBILITY SCORE: 24
DAILY ACTIVITIY SCORE: 24

## 2024-03-24 ASSESSMENT — PAIN - FUNCTIONAL ASSESSMENT
PAIN_FUNCTIONAL_ASSESSMENT: 0-10

## 2024-03-24 ASSESSMENT — PAIN DESCRIPTION - ORIENTATION
ORIENTATION: RIGHT
ORIENTATION: RIGHT

## 2024-03-24 ASSESSMENT — PAIN SCALES - GENERAL
PAINLEVEL_OUTOF10: 9
PAINLEVEL_OUTOF10: 10 - WORST POSSIBLE PAIN
PAINLEVEL_OUTOF10: 10 - WORST POSSIBLE PAIN
PAINLEVEL_OUTOF10: 9
PAINLEVEL_OUTOF10: 6
PAINLEVEL_OUTOF10: 8
PAINLEVEL_OUTOF10: 9
PAINLEVEL_OUTOF10: 9

## 2024-03-24 NOTE — SIGNIFICANT EVENT
Late entry note.      Pt seen on 3/23/2024. She has not concerns. VAC in place and holding. She is highly anticipating discharge.    Medically ready for discharge when VAC approval obtained.

## 2024-03-24 NOTE — PROGRESS NOTES
Discharge Planning note:      Susan Farfan is a 58 y.o. female on day 12 of admission presenting with Necrotizing soft tissue infection.      Call placed to [facility] [phone] for status of wound vac reports still in the pending status with [employer] once approved will deliver to patients room.                Shayy Yu RN

## 2024-03-24 NOTE — PROGRESS NOTES
"[facility]  ACUTE CARE SURGERY - PROGRESS NOTE    Patient Name: Susan Farfan  MRN: [id number]  Admit Date: 311  : 1965  AGE: 58 y.o.   GENDER: female  ==============================================================================  TODAY'S ASSESSMENT AND PLAN OF CARE:  Susan Farfan is a 58F PMHx smoking, BG elevations, presents as a transfer for NSTI of perineum s/p debridement (3/12/24). Patient was brought to ICU for desaturation, improving currently 6L on Venti-mask, otherwise hemodynamically stable. HgbA1C was 7.0. Adjusting medications/nursing orders per patient's wishes. 3/14 wound vac placed, changed 3/18, 3/21. Patient has no acute concerns, is medically ready for discharge with wound vac.    NEURO/PAIN:  -scheduled tylenol q8  -IV Dilaudid and oxy PRN for pain      RESPIRATORY:  -IS     GI: HgbA1C 7  -regular diet  -miralax and pericolace scheduled      :   -no concerns     MSK:   -OOB as tolerated  -PT evaluation- rec SNF; patient decision to go home      HEMATOLOGIC:   -monitor H&H      ENDOCRINE:   -no POCT and insulin as patient is refusing      INFECTIOUS DISEASE:   -cipro/flagyl (3/15- 3/21)  -Bcx NGTD x4 and Intra-op Cx 3/12 with mixed anaerobic bacteria  -wound vac changes (last changed 3/18)     Lines: Peripheral IV  DVT PROPHYLAXIS: lovenox , SCDs     Dispo:   In convo on 3/19, PT rec'ed SNF, however patient would like to go home. Discussed in depth home with home care for wound dressing changes. Patient refusing home care and would like to be set up with an outpatient wound care center. Explained to patient that she will need transport as she cannot take pain meds prior to driving. Patient states she understands but \"I will do what I want when I leave here\" Explained risks of driving while on narcotics.      Pt discharge pending home vac approval    Ismael Hernández MD  PGY1  General Surgery  Acute Care Surgery, Pager " [phone]    ==============================================================================  CHIEF COMPLAINT / EVENTS LAST 24HRS / HPI:  No new concerns overnight     MEDICAL HISTORY / ROS:   Admission history and ROS reviewed. Pertinent changes as follows:      PHYSICAL EXAM:  Heart Rate:  [77-80]   Temp:  [35.7 °C (96.3 °F)-36.4 °C (97.5 °F)]   Resp:  [16-18]   BP: (130-154)/(82-86)   SpO2:  [93 %-95 %]     Physical Exam  Constitutional - sitting in chair, NAD  Cards- non cyanotic   Resp- nonlabored breathing   Extremities- NEWTON   Skin- warm, dry   Wound: right groin wound vac in place and holding suction   Neuro- alert and oriented x3   Psych- appropriate mood      IMAGING SUMMARY:  (summary of new imaging findings, not a copy of dictation)  No new imaging    LABS:    No new labs

## 2024-03-25 ENCOUNTER — APPOINTMENT (OUTPATIENT)
Dept: WOUND CARE | Facility: HOSPITAL | Age: 59
End: 2024-03-25
Payer: MEDICAID

## 2024-03-25 PROCEDURE — 2500000004 HC RX 250 GENERAL PHARMACY W/ HCPCS (ALT 636 FOR OP/ED)

## 2024-03-25 PROCEDURE — 2500000001 HC RX 250 WO HCPCS SELF ADMINISTERED DRUGS (ALT 637 FOR MEDICARE OP): Performed by: PHYSICIAN ASSISTANT

## 2024-03-25 PROCEDURE — 1100000001 HC PRIVATE ROOM DAILY

## 2024-03-25 PROCEDURE — 99231 SBSQ HOSP IP/OBS SF/LOW 25: CPT | Performed by: SURGERY

## 2024-03-25 PROCEDURE — 2500000004 HC RX 250 GENERAL PHARMACY W/ HCPCS (ALT 636 FOR OP/ED): Performed by: PHYSICIAN ASSISTANT

## 2024-03-25 RX ORDER — HYDROMORPHONE HYDROCHLORIDE 1 MG/ML
0.2 INJECTION, SOLUTION INTRAMUSCULAR; INTRAVENOUS; SUBCUTANEOUS ONCE
Status: COMPLETED | OUTPATIENT
Start: 2024-03-25 | End: 2024-03-25

## 2024-03-25 RX ADMIN — ACETAMINOPHEN 975 MG: 325 TABLET ORAL at 20:03

## 2024-03-25 RX ADMIN — HYDROMORPHONE HYDROCHLORIDE 0.2 MG: 1 INJECTION, SOLUTION INTRAMUSCULAR; INTRAVENOUS; SUBCUTANEOUS at 01:39

## 2024-03-25 RX ADMIN — ACETAMINOPHEN 975 MG: 325 TABLET ORAL at 12:19

## 2024-03-25 RX ADMIN — HYDROMORPHONE HYDROCHLORIDE 0.2 MG: 1 INJECTION, SOLUTION INTRAMUSCULAR; INTRAVENOUS; SUBCUTANEOUS at 22:31

## 2024-03-25 RX ADMIN — OXYCODONE HYDROCHLORIDE 5 MG: 5 TABLET ORAL at 04:10

## 2024-03-25 RX ADMIN — OXYCODONE HYDROCHLORIDE 5 MG: 5 TABLET ORAL at 08:19

## 2024-03-25 RX ADMIN — OXYCODONE HYDROCHLORIDE 5 MG: 5 TABLET ORAL at 16:33

## 2024-03-25 RX ADMIN — HYDROMORPHONE HYDROCHLORIDE 0.2 MG: 1 INJECTION, SOLUTION INTRAMUSCULAR; INTRAVENOUS; SUBCUTANEOUS at 12:02

## 2024-03-25 RX ADMIN — OXYCODONE HYDROCHLORIDE 5 MG: 5 TABLET ORAL at 20:59

## 2024-03-25 RX ADMIN — ACETAMINOPHEN 975 MG: 325 TABLET ORAL at 05:45

## 2024-03-25 RX ADMIN — OXYCODONE HYDROCHLORIDE 5 MG: 5 TABLET ORAL at 12:18

## 2024-03-25 ASSESSMENT — PAIN - FUNCTIONAL ASSESSMENT
PAIN_FUNCTIONAL_ASSESSMENT: 0-10

## 2024-03-25 ASSESSMENT — PAIN SCALES - GENERAL
PAINLEVEL_OUTOF10: 10 - WORST POSSIBLE PAIN
PAINLEVEL_OUTOF10: 9
PAINLEVEL_OUTOF10: 10 - WORST POSSIBLE PAIN
PAINLEVEL_OUTOF10: 10 - WORST POSSIBLE PAIN

## 2024-03-25 ASSESSMENT — PAIN DESCRIPTION - LOCATION
LOCATION: GROIN
LOCATION: GROIN

## 2024-03-25 ASSESSMENT — PAIN DESCRIPTION - DESCRIPTORS: DESCRIPTORS: BURNING;THROBBING

## 2024-03-25 NOTE — PROGRESS NOTES
Discharge Planning Note:      Susan Farfan is a 58 y.o. female on day 13 of admission presenting with Necrotizing soft tissue infection.    Called patient  [employer] [phone] to inquire about the authorization for the wound vac. Reports vac was expedited on Friday 3/22/24. Had  department call this TCC back informed escalated up to the nurse to complete order today. Pending authorization for delivery to room from [facility]. Given reference number  [id number]            Shayy Yu, LINNEAC

## 2024-03-25 NOTE — CARE PLAN
The patient's goals for the shift include  Pain Control    The clinical goals for the shift include pain control      Problem: Pain  Goal: Takes deep breaths with improved pain control throughout the shift  Outcome: Progressing  Goal: Turns in bed with improved pain control throughout the shift  Outcome: Progressing  Goal: Walks with improved pain control throughout the shift  Outcome: Progressing  Goal: Performs ADL's with improved pain control throughout shift  Outcome: Progressing  Goal: Participates in PT with improved pain control throughout the shift  Outcome: Progressing  Goal: Free from opioid side effects throughout the shift  Outcome: Progressing  Goal: Free from acute confusion related to pain meds throughout the shift  Outcome: Progressing     Problem: Skin  Goal: Decreased wound size/increased tissue granulation at next dressing change  Outcome: Progressing  Goal: Participates in plan/prevention/treatment measures  Outcome: Progressing  Goal: Prevent/manage excess moisture  Outcome: Progressing  Goal: Prevent/minimize sheer/friction injuries  Outcome: Progressing  Goal: Promote/optimize nutrition  Outcome: Progressing  Goal: Promote skin healing  Outcome: Progressing     Problem: Pain - Adult  Goal: Verbalizes/displays adequate comfort level or baseline comfort level  Outcome: Progressing     Problem: Safety - Adult  Goal: Free from fall injury  Outcome: Progressing     Problem: Discharge Planning  Goal: Discharge to home or other facility with appropriate resources  Outcome: Progressing     Problem: Chronic Conditions and Co-morbidities  Goal: Patient's chronic conditions and co-morbidity symptoms are monitored and maintained or improved  Outcome: Progressing

## 2024-03-25 NOTE — PROGRESS NOTES
Wound Care Progress Note     Visit Date: 3/25/2024      Patient Name: Susan Farfan         MRN: [id number]                Reason for Visit: Wound vac dressing change         Wound History: 58F PMHx smoking, BG elevations, presents as a transfer for NSTI of perineum s/p debridement (3/12/24).      Wound Assessment:  Wound 03/11/24 Soft Tissue Necrosis Groin (Active)   Date First Assessed/Time First Assessed: 03/11/24 1930   Primary Wound Type: Soft Tissue Necrosis  Location: Groin      Assessments 3/25/2024  1:00 PM   Site Assessment Red;Pink;Granulation   Nicolle-Wound Assessment Clean;Dry;Intact   Non-staged Wound Description Full thickness   Shape linear   State of Healing Healing ridge;Early/partial granulation   Margins Well-defined edges   Drainage Description Serosanguineous   Drainage Amount Moderate   Dressing Vacuum dressing   Dressing Changed New   Dressing Status Clean;Dry       Inactive Orders   Date Order Priority Status Authorizing Provider   03/14/24 0849 Inpatient Consult to Wound and Ostomy Nurse Routine Completed Jasmin Aguilar PA-C     - Reason for Consult?:    Wound   03/12/24 0924 Wound care Routine Discontinued Chaitanya Izaguirre APRN-CNP     - Wound Complexity:    Simple       Wound 03/11/24 Soft Tissue Necrosis Groin (Active)   Wound Image   03/18/24 1619   Site Assessment Red;Pink;Granulation 03/25/24 1300   Nicolle-Wound Assessment Clean;Dry;Intact 03/25/24 1300   Non-staged Wound Description Full thickness 03/25/24 1300   Shape linear 03/25/24 1300   State of Healing Healing ridge;Early/partial granulation 03/25/24 1300   Margins Well-defined edges 03/25/24 1300   Drainage Description Serosanguineous 03/25/24 1300   Drainage Amount Moderate 03/25/24 1300   Dressing Vacuum dressing 03/25/24 1300   Dressing Changed New 03/25/24 1300   Dressing Status Clean;Dry 03/25/24 1300         Negative Pressure Wound Therapy Groin (Active)   Placement Date/Time: 03/15/24 1100   Location: Groin   Number of  days: 10     Negative Pressure Wound Therapy Groin (Active)   Unit Type KCI 03/25/24 1300   Dressing Type Black foam 03/25/24 1300   Number of Foam Pieces Used 2 03/25/24 1300   Cycle Continuous 03/25/24 1300   Target Pressure (mmHg) 125 03/25/24 1300   Canister Changed Yes 03/25/24 1300   Output (mL) 0 mL 03/24/24 2053     The wound care team came to bedside to replace the patient's wound vac dressing.  The old wound vac dressing was removed exposing healthy red/pink granulation tissue.  The wound was cleansed with vashe wound cleanser and the wound edges were pat dry with a 4x4 cover sponge.  3M cavilon, hollihesive and wound vac drape was applied to the wound edges.  1 piece of black foam was used to fill the wound and was covered with drape.  An additional strip of black foam was used to track away from the wound and the track pad was applied.  The KCI wound vac is set at -125 mmHg and continuous.  Medication were given prior to the wound vac dressing change.  The patient tolerated well.     Wound Team Plan: Next VAC change is 3/28     Christos Don RN-BC, CWON  3/25/2024  2:51 PM

## 2024-03-25 NOTE — PROGRESS NOTES
"[facility]  ACUTE CARE SURGERY - PROGRESS NOTE    Patient Name: Susan Farfan  MRN: [id number]  Admit Date: 311  : 1965  AGE: 58 y.o.   GENDER: female  ==============================================================================  TODAY'S ASSESSMENT AND PLAN OF CARE:  Susan Farfan is a 58F PMHx smoking, BG elevations, presents as a transfer for NSTI of perineum s/p debridement (3/12/24). Patient was brought to ICU for desaturation, improving currently 6L on Venti-mask, otherwise hemodynamically stable. HgbA1C was 7.0. Adjusting medications/nursing orders per patient's wishes. 3/14 wound vac placed, changed 3/18, 3/21. Patient has no acute concerns, is medically ready for discharge with wound vac. 3/25 Remains medically ready, pending wound vac auth, may need to transition to WTD in order for discharge     NEURO/PAIN:  -scheduled tylenol q8  -IV Dilaudid and oxy PRN for pain      RESPIRATORY:  -IS     GI: HgbA1C 7  -regular diet  -miralax and pericolace scheduled      :   -no concerns     MSK:   -OOB as tolerated  -PT evaluation- rec SNF; patient decision to go home      HEMATOLOGIC:   -monitor H&H      ENDOCRINE:   -no POCT and insulin as patient is refusing      INFECTIOUS DISEASE:   -cipro/flagyl (3/15- 3/21)  -Bcx NGTD x4 and Intra-op Cx 3/12 with mixed anaerobic bacteria  -wound vac changes (last changed 3/21)     Lines: Peripheral IV  DVT PROPHYLAXIS: lovenox , SCDs     Dispo:   In convo on 3/19, PT rec'ed SNF, however patient would like to go home. Discussed in depth home with home care for wound dressing changes. Patient refusing home care and would like to be set up with an outpatient wound care center. Explained to patient that she will need transport as she cannot take pain meds prior to driving. Patient states she understands but \"I will do what I want when I leave here\" Explained risks of driving while on narcotics.      Pt discharge pending home vac " approval, if patient is still here tomorrow, plan to change vac     Can Alaniz MD   PGY1, [facility]  Acute Care Surgery, Pager [phone]  ==============================================================================  CHIEF COMPLAINT / EVENTS LAST 24HRS / HPI:  No new concerns overnight     MEDICAL HISTORY / ROS:   Admission history and ROS reviewed. Pertinent changes as follows:    PHYSICAL EXAM:  Heart Rate:  [79-81]   Temp:  [36.2 °C (97.2 °F)-36.6 °C (97.9 °F)]   Resp:  [16-17]   BP: (136-166)/(59-84)   SpO2:  [94 %]     Physical Exam  Constitutional - sitting in chair, NAD  Cards- non cyanotic   Resp- nonlabored breathing   Extremities- NEWTON   Skin- warm, dry   Wound: right groin wound vac in place and holding suction   Neuro- alert and oriented x3   Psych- appropriate mood      IMAGING SUMMARY:  (summary of new imaging findings, not a copy of dictation)  No new imaging    LABS:    No new labs

## 2024-03-26 PROCEDURE — 2500000004 HC RX 250 GENERAL PHARMACY W/ HCPCS (ALT 636 FOR OP/ED): Performed by: PHYSICIAN ASSISTANT

## 2024-03-26 PROCEDURE — 1100000001 HC PRIVATE ROOM DAILY

## 2024-03-26 PROCEDURE — 2500000001 HC RX 250 WO HCPCS SELF ADMINISTERED DRUGS (ALT 637 FOR MEDICARE OP): Performed by: PHYSICIAN ASSISTANT

## 2024-03-26 PROCEDURE — 99232 SBSQ HOSP IP/OBS MODERATE 35: CPT | Performed by: NURSE PRACTITIONER

## 2024-03-26 RX ADMIN — OXYCODONE HYDROCHLORIDE 5 MG: 5 TABLET ORAL at 05:39

## 2024-03-26 RX ADMIN — HYDROMORPHONE HYDROCHLORIDE 0.2 MG: 1 INJECTION, SOLUTION INTRAMUSCULAR; INTRAVENOUS; SUBCUTANEOUS at 06:44

## 2024-03-26 RX ADMIN — OXYCODONE HYDROCHLORIDE 5 MG: 5 TABLET ORAL at 11:25

## 2024-03-26 RX ADMIN — ACETAMINOPHEN 975 MG: 325 TABLET ORAL at 20:47

## 2024-03-26 RX ADMIN — ACETAMINOPHEN 975 MG: 325 TABLET ORAL at 05:39

## 2024-03-26 RX ADMIN — HYDROMORPHONE HYDROCHLORIDE 0.2 MG: 1 INJECTION, SOLUTION INTRAMUSCULAR; INTRAVENOUS; SUBCUTANEOUS at 14:15

## 2024-03-26 RX ADMIN — HYDROMORPHONE HYDROCHLORIDE 0.2 MG: 1 INJECTION, SOLUTION INTRAMUSCULAR; INTRAVENOUS; SUBCUTANEOUS at 20:47

## 2024-03-26 RX ADMIN — OXYCODONE HYDROCHLORIDE 5 MG: 5 TABLET ORAL at 18:01

## 2024-03-26 RX ADMIN — OXYCODONE HYDROCHLORIDE 5 MG: 5 TABLET ORAL at 22:21

## 2024-03-26 RX ADMIN — ACETAMINOPHEN 975 MG: 325 TABLET ORAL at 14:15

## 2024-03-26 RX ADMIN — OXYCODONE HYDROCHLORIDE 5 MG: 5 TABLET ORAL at 01:02

## 2024-03-26 ASSESSMENT — PAIN DESCRIPTION - ORIENTATION
ORIENTATION: RIGHT

## 2024-03-26 ASSESSMENT — PAIN SCALES - GENERAL
PAINLEVEL_OUTOF10: 10 - WORST POSSIBLE PAIN
PAINLEVEL_OUTOF10: 6
PAINLEVEL_OUTOF10: 8
PAINLEVEL_OUTOF10: 0 - NO PAIN
PAINLEVEL_OUTOF10: 9
PAINLEVEL_OUTOF10: 9
PAINLEVEL_OUTOF10: 10 - WORST POSSIBLE PAIN
PAINLEVEL_OUTOF10: 9
PAINLEVEL_OUTOF10: 8
PAINLEVEL_OUTOF10: 10 - WORST POSSIBLE PAIN
PAINLEVEL_OUTOF10: 10 - WORST POSSIBLE PAIN
PAINLEVEL_OUTOF10: 5 - MODERATE PAIN
PAINLEVEL_OUTOF10: 10 - WORST POSSIBLE PAIN
PAINLEVEL_OUTOF10: 9
PAINLEVEL_OUTOF10: 10 - WORST POSSIBLE PAIN

## 2024-03-26 ASSESSMENT — PAIN - FUNCTIONAL ASSESSMENT
PAIN_FUNCTIONAL_ASSESSMENT: 0-10

## 2024-03-26 ASSESSMENT — PAIN DESCRIPTION - LOCATION
LOCATION: GROIN

## 2024-03-26 NOTE — PROGRESS NOTES
Discharge Planning Note:       Susan Farfan is a 58 y.o. female on day 14 of admission presenting with Necrotizing soft tissue infection.    Patient completed the issa form sent to [facility] for review. Gave the team the P2P number for insurance denial of wound vac.       Addendum 3/26/24 @ 342 pm [facility] denied issa wound vac . The team scheduled to complete a P2P tomorrow or Thursday.     Shayy Yu RN TCC

## 2024-03-26 NOTE — PROGRESS NOTES
"[facility]  ACUTE CARE SURGERY - PROGRESS NOTE    Patient Name: Susan Farfan  MRN: [id number]  Admit Date: 311  : 1965  AGE: 58 y.o.   GENDER: female  ==============================================================================  TODAY'S ASSESSMENT AND PLAN OF CARE:  Susan Farfan is a 58F PMHx smoking, BG elevations, presents as a transfer for NSTI of perineum s/p debridement (3/12/24). Patient was brought to ICU for desaturation, improving currently 6L on Venti-mask, otherwise hemodynamically stable. HgbA1C was 7.0. Adjusting medications/nursing orders per patient's wishes. 3/14 wound vac placed, changed 3/18, 3/21. Patient has no acute concerns, is medically ready for discharge with wound vac. 3/25 Remains medically ready, pending wound vac auth, may need to transition to WTD in order for discharge     NEURO/PAIN:  -scheduled tylenol q8  -IV Dilaudid and oxy PRN for pain      RESPIRATORY:  -IS     GI: HgbA1C 7  -regular diet  -miralax and pericolace scheduled      :   -no concerns     MSK:   -OOB as tolerated  -PT evaluation- rec SNF; patient decision to go home      HEMATOLOGIC:   -monitor H&H      ENDOCRINE:   -no POCT and insulin as patient is refusing      INFECTIOUS DISEASE:   -cipro/flagyl (3/15- 3/21)  -Bcx NGTD x4 and Intra-op Cx 3/12 with mixed anaerobic bacteria  -wound vac changes (last changed 3/25)     Lines: Peripheral IV  DVT PROPHYLAXIS: lovenox , SCDs     Dispo:   In convo on 3/19, PT rec'ed SNF, however patient would like to go home. Discussed in depth home with home care for wound dressing changes. Patient refusing home care and would like to be set up with an outpatient wound care center. Explained to patient that she will need transport as she cannot take pain meds prior to driving. Patient states she understands but \"I will do what I want when I leave here\" Explained risks of driving while on narcotics.      Pt discharge pending home vac " approval, currently denied. Will attempt to appeal vs issa vac    Chaitanya HUGGINS-CNP   Acute Care Surgery, Pager [phone]  ==============================================================================  CHIEF COMPLAINT / EVENTS LAST 24HRS / HPI:  No acute events overnight, upset about still being in the hospital     MEDICAL HISTORY / ROS:   Admission history and ROS reviewed. Pertinent changes as follows:    PHYSICAL EXAM:  Heart Rate:  [72-86]   Temp:  [35.9 °C (96.6 °F)-36.7 °C (98.1 °F)]   Resp:  [18]   BP: (132-143)/(65-85)   SpO2:  [93 %-94 %]     Physical Exam  Constitutional - sitting on side of bed, NAD   Cards- non cyanotic   Resp- nonlabored breathing   Extremities- NEWTON   Skin- warm, dry   Wound: right groin wound vac in place and holding suction   Neuro- alert and oriented x3   Psych- appropriate mood      IMAGING SUMMARY:  (summary of new imaging findings, not a copy of dictation)  No new imaging    LABS:    No new labs

## 2024-03-27 VITALS
RESPIRATION RATE: 18 BRPM | HEIGHT: 65 IN | WEIGHT: 281 LBS | BODY MASS INDEX: 46.82 KG/M2 | OXYGEN SATURATION: 93 % | TEMPERATURE: 97 F | HEART RATE: 84 BPM | DIASTOLIC BLOOD PRESSURE: 75 MMHG | SYSTOLIC BLOOD PRESSURE: 155 MMHG

## 2024-03-27 PROCEDURE — 1100000001 HC PRIVATE ROOM DAILY

## 2024-03-27 PROCEDURE — 99232 SBSQ HOSP IP/OBS MODERATE 35: CPT | Performed by: NURSE PRACTITIONER

## 2024-03-27 PROCEDURE — 2500000001 HC RX 250 WO HCPCS SELF ADMINISTERED DRUGS (ALT 637 FOR MEDICARE OP): Performed by: PHYSICIAN ASSISTANT

## 2024-03-27 PROCEDURE — 2500000004 HC RX 250 GENERAL PHARMACY W/ HCPCS (ALT 636 FOR OP/ED): Performed by: PHYSICIAN ASSISTANT

## 2024-03-27 RX ADMIN — ACETAMINOPHEN 975 MG: 325 TABLET ORAL at 21:00

## 2024-03-27 RX ADMIN — ACETAMINOPHEN 975 MG: 325 TABLET ORAL at 05:05

## 2024-03-27 RX ADMIN — OXYCODONE HYDROCHLORIDE 5 MG: 5 TABLET ORAL at 18:21

## 2024-03-27 RX ADMIN — OXYCODONE HYDROCHLORIDE 5 MG: 5 TABLET ORAL at 23:27

## 2024-03-27 RX ADMIN — HYDROMORPHONE HYDROCHLORIDE 0.2 MG: 1 INJECTION, SOLUTION INTRAMUSCULAR; INTRAVENOUS; SUBCUTANEOUS at 11:29

## 2024-03-27 RX ADMIN — HYDROMORPHONE HYDROCHLORIDE 0.2 MG: 1 INJECTION, SOLUTION INTRAMUSCULAR; INTRAVENOUS; SUBCUTANEOUS at 20:58

## 2024-03-27 RX ADMIN — ACETAMINOPHEN 975 MG: 325 TABLET ORAL at 14:21

## 2024-03-27 RX ADMIN — OXYCODONE HYDROCHLORIDE 5 MG: 5 TABLET ORAL at 14:16

## 2024-03-27 RX ADMIN — OXYCODONE HYDROCHLORIDE 5 MG: 5 TABLET ORAL at 09:19

## 2024-03-27 RX ADMIN — HYDROMORPHONE HYDROCHLORIDE 0.2 MG: 1 INJECTION, SOLUTION INTRAMUSCULAR; INTRAVENOUS; SUBCUTANEOUS at 05:05

## 2024-03-27 ASSESSMENT — PAIN SCALES - GENERAL
PAINLEVEL_OUTOF10: 8
PAINLEVEL_OUTOF10: 9
PAINLEVEL_OUTOF10: 9
PAINLEVEL_OUTOF10: 6
PAINLEVEL_OUTOF10: 10 - WORST POSSIBLE PAIN
PAINLEVEL_OUTOF10: 10 - WORST POSSIBLE PAIN
PAINLEVEL_OUTOF10: 6
PAINLEVEL_OUTOF10: 10 - WORST POSSIBLE PAIN
PAINLEVEL_OUTOF10: 10 - WORST POSSIBLE PAIN
PAINLEVEL_OUTOF10: 8
PAINLEVEL_OUTOF10: 10 - WORST POSSIBLE PAIN

## 2024-03-27 ASSESSMENT — COGNITIVE AND FUNCTIONAL STATUS - GENERAL
DAILY ACTIVITIY SCORE: 24
MOBILITY SCORE: 24

## 2024-03-27 ASSESSMENT — PAIN DESCRIPTION - ORIENTATION: ORIENTATION: RIGHT

## 2024-03-27 ASSESSMENT — PAIN - FUNCTIONAL ASSESSMENT
PAIN_FUNCTIONAL_ASSESSMENT: 0-10

## 2024-03-27 ASSESSMENT — PAIN SCALES - PAIN ASSESSMENT IN ADVANCED DEMENTIA (PAINAD)
BREATHING: NORMAL
CONSOLABILITY: DISTRACTED OR REASSURED BY VOICE/TOUCH
BODYLANGUAGE: TENSE, DISTRESSED PACING, FIDGETING

## 2024-03-27 ASSESSMENT — PAIN DESCRIPTION - LOCATION
LOCATION: GROIN
LOCATION: GROIN

## 2024-03-27 NOTE — CARE PLAN
Patient inquire about short term disability. There is no short term disability except that which is offered through an employer.  I informed the patient of this and she did not ask about any additional assistance.        Discussed the following topics on behalf of the patient:  []  Behavioral Health Assistance     []  Case Management  []   Assistance  []  Digital Equity Assistance  []  Dental Health Assistance  []  Education Assistance  []  Employment Assistance  [x]  Financial Strain Relief Assistance  []  Food Insecurity Assistance  []  Healthcare Coverage Assistance  []  Housing Stability Assistance  []  IP Violence Relief Assistance  []  Legal Assistance  []  Physical Activity Assistance  []  Social Connection Assistance  []  Stress Relief Assistance   []  Substance Abuse Assistance  []  Transportation Assistance  []  Utility Assistance  []  Other: [insert comment here]    Next Steps:         Charla Mondragon LCSW

## 2024-03-27 NOTE — CARE PLAN
The patient's goals for the shift include  pain control.     The clinical goals for the shift include patient will remain HDS throughout shift.    Over the shift, the patient did  make progress towards her goals, remained stable and utilized pain regime for pain control.

## 2024-03-27 NOTE — PROGRESS NOTES
SW received an update from [facility] that patient is interested in resources for potential short term disability. RAMIRO emailed [facility] to provide resources. Will continue to follow.     SHAW Navarrete

## 2024-03-27 NOTE — PROGRESS NOTES
"[facility]  ACUTE CARE SURGERY - PROGRESS NOTE    Patient Name: Susan Farfan  MRN: [id number]  Admit Date: 311  : 1965  AGE: 58 y.o.   GENDER: female  ==============================================================================  TODAY'S ASSESSMENT AND PLAN OF CARE:  Susan Farfan is a 58F PMHx smoking, BG elevations, presents as a transfer for NSTI of perineum s/p debridement (3/12/24). Patient was brought to ICU for desaturation, improving currently 6L on Venti-mask, otherwise hemodynamically stable. HgbA1C was 7.0. Adjusting medications/nursing orders per patient's wishes. 3/14 wound vac placed, changed 3/18, 3/21. Patient has no acute concerns, is medically ready for discharge with wound vac. 3/25 Remains medically ready, pending wound vac auth, may need to transition to WTD in order for discharge     NEURO/PAIN:  -scheduled tylenol q8  -IV Dilaudid and oxy PRN for pain      RESPIRATORY:  -IS     GI: HgbA1C 7  -regular diet  -miralax and pericolace scheduled      :   -no concerns     MSK:   -OOB as tolerated  -PT evaluation- rec SNF; patient decision to go home      HEMATOLOGIC:   -monitor H&H      ENDOCRINE:   -no POCT and insulin as patient is refusing      INFECTIOUS DISEASE:   -cipro/flagyl (3/15- 3/21)  -Bcx NGTD x4 and Intra-op Cx 3/12 with mixed anaerobic bacteria  -wound vac changes (last changed 3/25)     Lines: Peripheral IV  DVT PROPHYLAXIS: lovenox , SCDs     Dispo:   In convo on 3/19, PT rec'ed SNF, however patient would like to go home. Discussed in depth home with home care for wound dressing changes. Patient refusing home care and would like to be set up with an outpatient wound care center. Explained to patient that she will need transport as she cannot take pain meds prior to driving. Patient states she understands but \"I will do what I want when I leave here\" Explained risks of driving while on narcotics.      P2P completed this AM with [facility]- " wound vac approved for home. Patient dressing to be changed at bedside Thursday AM and will discharge home tomorrow. Plan for follow up with outpatient wound clinic Monday     Chaitanya HUGGINS-CNP   Acute Care Surgery, Pager [phone]  ==============================================================================  CHIEF COMPLAINT / EVENTS LAST 24HRS / HPI:  No acute events overnight    MEDICAL HISTORY / ROS:   Admission history and ROS reviewed. Pertinent changes as follows:    PHYSICAL EXAM:  Heart Rate:  [76-89]   Temp:  [36.1 °C (97 °F)-36.3 °C (97.3 °F)]   Resp:  [18]   BP: (138-139)/(86-92)   SpO2:  [93 %-95 %]     Physical Exam  Constitutional - sitting on side of bed, NAD   Cards- non cyanotic   Resp- nonlabored breathing   Extremities- NEWTON   Skin- warm, dry   Wound: right groin wound vac in place and holding suction   Neuro- alert and oriented x3   Psych- appropriate mood      IMAGING SUMMARY:  (summary of new imaging findings, not a copy of dictation)  No new imaging    LABS:    No new labs

## 2024-03-27 NOTE — CARE PLAN
The patient's goals for the shift include      The clinical goals for the shift include pt will remain safe during shift      Problem: Pain  Goal: Takes deep breaths with improved pain control throughout the shift  3/26/2024 2344 by Casandra Leon RN  Outcome: Progressing  3/26/2024 2344 by Casandra Leon RN  Outcome: Progressing  Goal: Turns in bed with improved pain control throughout the shift  3/26/2024 2344 by Casandra Leon RN  Outcome: Progressing  3/26/2024 2344 by Casandra Leon RN  Outcome: Progressing  Goal: Walks with improved pain control throughout the shift  3/26/2024 2344 by Casandra Leon RN  Outcome: Progressing  3/26/2024 2344 by Casandra Leon RN  Outcome: Progressing  Goal: Performs ADL's with improved pain control throughout shift  3/26/2024 2344 by Casandra Leon RN  Outcome: Progressing  3/26/2024 2344 by Casandra Leon RN  Outcome: Progressing  Goal: Participates in PT with improved pain control throughout the shift  3/26/2024 2344 by Casandra Leon RN  Outcome: Progressing  3/26/2024 2344 by Casandra Leon RN  Outcome: Progressing  Goal: Free from opioid side effects throughout the shift  3/26/2024 2344 by Casandra Leon RN  Outcome: Progressing  3/26/2024 2344 by Casandra Leon RN  Outcome: Progressing  Goal: Free from acute confusion related to pain meds throughout the shift  3/26/2024 2344 by Casandra Leon RN  Outcome: Progressing  3/26/2024 2344 by Casandra Leon RN  Outcome: Progressing     Problem: Skin  Goal: Decreased wound size/increased tissue granulation at next dressing change  3/26/2024 2344 by Casandra Leon RN  Outcome: Progressing  3/26/2024 2344 by Casandra Leon RN  Outcome: Progressing  Flowsheets (Taken 3/26/2024 2344)  Decreased wound size/increased tissue granulation at next dressing change: Promote sleep for wound healing  Goal: Participates in plan/prevention/treatment measures  3/26/2024 2344 by Casandra Leon RN  Outcome: Progressing  3/26/2024 2344 by Casandra Leon RN  Outcome:  Progressing  Goal: Prevent/manage excess moisture  3/26/2024 2344 by Casandra Leon RN  Outcome: Progressing  3/26/2024 2344 by Casandra Leon RN  Outcome: Progressing  Goal: Prevent/minimize sheer/friction injuries  3/26/2024 2344 by Casandra Leon RN  Outcome: Progressing  3/26/2024 2344 by Casandra Leon RN  Outcome: Progressing  Goal: Promote/optimize nutrition  3/26/2024 2344 by Casandra Leon RN  Outcome: Progressing  3/26/2024 2344 by Casandra Leon RN  Outcome: Progressing  Goal: Promote skin healing  3/26/2024 2344 by Casandra Leon RN  Outcome: Progressing  3/26/2024 2344 by Casandra Leon RN  Outcome: Progressing     Problem: Pain - Adult  Goal: Verbalizes/displays adequate comfort level or baseline comfort level  3/26/2024 2344 by Casandra Leon RN  Outcome: Progressing  3/26/2024 2344 by Casandra Leon RN  Outcome: Progressing     Problem: Safety - Adult  Goal: Free from fall injury  3/26/2024 2344 by Casandra Leon RN  Outcome: Progressing  3/26/2024 2344 by Casandra Leon RN  Outcome: Progressing     Problem: Discharge Planning  Goal: Discharge to home or other facility with appropriate resources  3/26/2024 2344 by Casandra Leon RN  Outcome: Progressing  3/26/2024 2344 by Casandra Leon RN  Outcome: Progressing     Problem: Chronic Conditions and Co-morbidities  Goal: Patient's chronic conditions and co-morbidity symptoms are monitored and maintained or improved  3/26/2024 2344 by Casandra Leon RN  Outcome: Progressing  3/26/2024 2344 by Casandra Leon RN  Outcome: Progressing

## 2024-03-27 NOTE — CARE PLAN
Patient had questions about paying her bills.  She was provided with information on emergency cash assistance, [employer] and [employer]. The patient was not please with the information that we provided.  We stated that this was the best we could provide in terms of resources as we could not pay off bills.           Discussed the following topics on behalf of the patient:  []  Behavioral Health Assistance     []  Case Management  []   Assistance  []  Digital Equity Assistance  []  Dental Health Assistance  []  Education Assistance  []  Employment Assistance  [x]  Financial Strain Relief Assistance  []  Food Insecurity Assistance  []  Healthcare Coverage Assistance  []  Housing Stability Assistance  []  IP Violence Relief Assistance  []  Legal Assistance  []  Physical Activity Assistance  []  Social Connection Assistance  []  Stress Relief Assistance   []  Substance Abuse Assistance  []  Transportation Assistance  [x]  Utility Assistance  []  Other: [insert comment here]    Next Steps:         Charla Mondragon LCSW

## 2024-03-28 PROCEDURE — 2500000004 HC RX 250 GENERAL PHARMACY W/ HCPCS (ALT 636 FOR OP/ED): Performed by: PHYSICIAN ASSISTANT

## 2024-03-28 PROCEDURE — 2500000001 HC RX 250 WO HCPCS SELF ADMINISTERED DRUGS (ALT 637 FOR MEDICARE OP): Performed by: PHYSICIAN ASSISTANT

## 2024-03-28 PROCEDURE — 99232 SBSQ HOSP IP/OBS MODERATE 35: CPT | Performed by: PHYSICIAN ASSISTANT

## 2024-03-28 RX ORDER — HYDROMORPHONE HYDROCHLORIDE 1 MG/ML
0.2 INJECTION, SOLUTION INTRAMUSCULAR; INTRAVENOUS; SUBCUTANEOUS EVERY 12 HOURS PRN
Status: DISCONTINUED | OUTPATIENT
Start: 2024-03-28 | End: 2024-03-28

## 2024-03-28 RX ORDER — OXYCODONE HYDROCHLORIDE 5 MG/1
2.5 TABLET ORAL ONCE
Status: COMPLETED | OUTPATIENT
Start: 2024-03-28 | End: 2024-03-28

## 2024-03-28 RX ADMIN — ACETAMINOPHEN 975 MG: 325 TABLET ORAL at 05:55

## 2024-03-28 RX ADMIN — OXYCODONE HYDROCHLORIDE 5 MG: 5 TABLET ORAL at 10:19

## 2024-03-28 RX ADMIN — OXYCODONE HYDROCHLORIDE 5 MG: 5 TABLET ORAL at 05:55

## 2024-03-28 RX ADMIN — OXYCODONE HYDROCHLORIDE 2.5 MG: 5 TABLET ORAL at 10:17

## 2024-03-28 RX ADMIN — HYDROMORPHONE HYDROCHLORIDE 0.2 MG: 1 INJECTION, SOLUTION INTRAMUSCULAR; INTRAVENOUS; SUBCUTANEOUS at 03:39

## 2024-03-28 ASSESSMENT — PAIN SCALES - GENERAL
PAINLEVEL_OUTOF10: 5 - MODERATE PAIN
PAINLEVEL_OUTOF10: 10 - WORST POSSIBLE PAIN
PAINLEVEL_OUTOF10: 2
PAINLEVEL_OUTOF10: 10 - WORST POSSIBLE PAIN
PAINLEVEL_OUTOF10: 10 - WORST POSSIBLE PAIN
PAINLEVEL_OUTOF10: 6
PAINLEVEL_OUTOF10: 5 - MODERATE PAIN
PAINLEVEL_OUTOF10: 8

## 2024-03-28 ASSESSMENT — PAIN DESCRIPTION - LOCATION
LOCATION: PERINEUM
LOCATION: GROIN
LOCATION: GROIN

## 2024-03-28 ASSESSMENT — COGNITIVE AND FUNCTIONAL STATUS - GENERAL
DAILY ACTIVITIY SCORE: 24
MOBILITY SCORE: 24

## 2024-03-28 ASSESSMENT — PAIN DESCRIPTION - ORIENTATION
ORIENTATION: RIGHT

## 2024-03-28 ASSESSMENT — PAIN - FUNCTIONAL ASSESSMENT
PAIN_FUNCTIONAL_ASSESSMENT: 0-10

## 2024-03-28 ASSESSMENT — PAIN DESCRIPTION - DESCRIPTORS
DESCRIPTORS: ACHING
DESCRIPTORS: ACHING

## 2024-03-28 NOTE — CARE PLAN
Problem: Pain  Goal: Takes deep breaths with improved pain control throughout the shift  Outcome: Adequate for Discharge  Goal: Turns in bed with improved pain control throughout the shift  Outcome: Adequate for Discharge  Goal: Walks with improved pain control throughout the shift  Outcome: Adequate for Discharge  Goal: Performs ADL's with improved pain control throughout shift  Outcome: Adequate for Discharge  Goal: Participates in PT with improved pain control throughout the shift  Outcome: Adequate for Discharge  Goal: Free from opioid side effects throughout the shift  Outcome: Adequate for Discharge  Goal: Free from acute confusion related to pain meds throughout the shift  Outcome: Adequate for Discharge     Problem: Skin  Goal: Decreased wound size/increased tissue granulation at next dressing change  Outcome: Adequate for Discharge  Goal: Participates in plan/prevention/treatment measures  Outcome: Adequate for Discharge  Goal: Prevent/manage excess moisture  Outcome: Adequate for Discharge  Goal: Prevent/minimize sheer/friction injuries  Outcome: Adequate for Discharge  Goal: Promote/optimize nutrition  Outcome: Adequate for Discharge  Goal: Promote skin healing  Outcome: Adequate for Discharge     Problem: Pain - Adult  Goal: Verbalizes/displays adequate comfort level or baseline comfort level  Outcome: Adequate for Discharge     Problem: Safety - Adult  Goal: Free from fall injury  Outcome: Adequate for Discharge     Problem: Discharge Planning  Goal: Discharge to home or other facility with appropriate resources  Outcome: Adequate for Discharge     Problem: Chronic Conditions and Co-morbidities  Goal: Patient's chronic conditions and co-morbidity symptoms are monitored and maintained or improved  Outcome: Adequate for Discharge     Problem: Fall/Injury  Goal: Not fall by end of shift  Outcome: Adequate for Discharge  Goal: Be free from injury by end of the shift  Outcome: Adequate for Discharge  Goal:  Verbalize understanding of personal risk factors for fall in the hospital  Outcome: Adequate for Discharge  Goal: Verbalize understanding of risk factor reduction measures to prevent injury from fall in the home  Outcome: Adequate for Discharge  Goal: Use assistive devices by end of the shift  Outcome: Adequate for Discharge  Goal: Pace activities to prevent fatigue by end of the shift  Outcome: Adequate for Discharge     Problem: Pain  Goal: My pain/discomfort is manageable  Outcome: Adequate for Discharge     Problem: Safety  Goal: Patient will be injury free during hospitalization  Outcome: Adequate for Discharge  Goal: I will remain free of falls  Outcome: Adequate for Discharge     Problem: Daily Care  Goal: Daily care needs are met  Outcome: Adequate for Discharge     Problem: Psychosocial Needs  Goal: Demonstrates ability to cope with hospitalization/illness  Outcome: Adequate for Discharge  Goal: Collaborate with me, my family, and caregiver to identify my specific goals  Outcome: Adequate for Discharge     Problem: Discharge Barriers  Goal: My discharge needs are met  Outcome: Adequate for Discharge   The patient's goals for the shift include      The clinical goals for the shift include Safe and free from injury during shift

## 2024-03-28 NOTE — DISCHARGE INSTRUCTIONS
Please follow up with your primary care provider at next available appointment following your hospital stay.     Continue your wound vac  Please follow up outpatient with wound clinic    To schedule a follow up appointment with the Acute Care Surgery/ Trauma clinic, please call [phone] to schedule follow up appointment. This is not an emergency number, so if you have an emergency, please go to the Emergency Room. However, if you have questions regarding your care, upcoming appointments, etc, please do not hesitate to call the above number. Thank you!     Please go to the nearest hospital emergency room if you are experiencing: worsening abdominal pain, increasing abdominal distention, fevers, inability to tolerate food and drink (vomit every time you eat), nausea/vomiting, loss of bowel function (unable to pass gas or have bowel movements).     If you notice increased redness, tenderness or drainage around your surgical sites/wounds, or if you notice foul smelling drainage from your wounds please call the trauma clinic or go to the nearest to the emergency room.

## 2024-03-28 NOTE — PROGRESS NOTES
"[facility]  ACUTE CARE SURGERY - PROGRESS NOTE    Patient Name: Susan Farfan  MRN: [id number]  Admit Date: 311  : 1965  AGE: 58 y.o.   GENDER: female  ==============================================================================  TODAY'S ASSESSMENT AND PLAN OF CARE:  Susan Farfan is a 58F PMHx smoking, BG elevations, presents as a transfer for NSTI of perineum s/p debridement (3/12/24). Patient was brought to ICU for desaturation, improving currently 6L on Venti-mask, otherwise hemodynamically stable. HgbA1C was 7.0. Adjusting medications/nursing orders per patient's wishes. 3/14 wound vac placed, changed 3/18, 3/21. Patient has no acute concerns, is medically ready for discharge with wound vac. 3/25 Remains medically ready, pending wound vac auth, may need to transition to WTD in order for discharge     NEURO/PAIN:  -scheduled tylenol q8  -IV Dilaudid and PO oxy PRN for pain      RESPIRATORY:  -IS     GI: HgbA1C 7  -regular diet  -miralax and pericolace scheduled      :   -no concerns     MSK:   -OOB as tolerated  -PT evaluation- rec SNF; patient decision to go home      HEMATOLOGIC:   -monitor H&H      ENDOCRINE:   -no POCT and insulin as patient is refusing      INFECTIOUS DISEASE:   -cipro/flagyl (3/15- 3/21)  -Bcx NGTD x4 and Intra-op Cx 3/12 with mixed anaerobic bacteria  -wound vac changes (last changed 3/25)     Lines: Peripheral IV  DVT PROPHYLAXIS: lovenox , SCDs     Dispo:   In convo on 3/19, PT rec'ed SNF, however patient would like to go home. Discussed in depth home with home care for wound dressing changes. Patient refusing home care and would like to be set up with an outpatient wound care center. Explained to patient that she will need transport as she cannot take pain meds prior to driving. Patient states she understands but \"I will do what I want when I leave here\" Explained risks of driving while on narcotics.      P2P completed this AM with " [facility]- wound vac approved for home. Patient dressing to be changed at bedside Thursday AM and will discharge home after. Plan for follow up with outpatient wound clinic Monday        Discussed with Dr Lisandro Aguilar PA-C    ==============================================================================  CHIEF COMPLAINT / EVENTS LAST 24HRS / HPI:  No acute events overnight    MEDICAL HISTORY / ROS:   Admission history and ROS reviewed. Pertinent changes as follows:    PHYSICAL EXAM:  Heart Rate:  [84-89]   Temp:  [35.8 °C (96.4 °F)-36.1 °C (97 °F)]   Resp:  [18]   BP: (139-155)/(75-92)   SpO2:  [93 %-95 %]     Physical Exam  Constitutional - sitting on side of bed, NAD   Cards- non cyanotic   Resp- nonlabored breathing   Extremities- NEWTON   Skin- warm, dry   Wound: right groin wound vac in place and holding suction   Neuro- alert and oriented x3   Psych- appropriate mood      IMAGING SUMMARY:  (summary of new imaging findings, not a copy of dictation)  No new imaging    LABS:    No new labs

## 2024-04-01 ENCOUNTER — OFFICE VISIT (OUTPATIENT)
Dept: WOUND CARE | Facility: HOSPITAL | Age: 59
End: 2024-04-01
Payer: MEDICAID

## 2024-04-01 PROCEDURE — 99214 OFFICE O/P EST MOD 30 MIN: CPT

## 2024-04-04 ENCOUNTER — CLINICAL SUPPORT (OUTPATIENT)
Dept: WOUND CARE | Facility: HOSPITAL | Age: 59
End: 2024-04-04
Payer: MEDICAID

## 2024-04-09 ENCOUNTER — OFFICE VISIT (OUTPATIENT)
Dept: WOUND CARE | Facility: HOSPITAL | Age: 59
End: 2024-04-09
Payer: MEDICAID

## 2024-04-09 PROCEDURE — 99213 OFFICE O/P EST LOW 20 MIN: CPT

## 2024-04-16 ENCOUNTER — OFFICE VISIT (OUTPATIENT)
Dept: WOUND CARE | Facility: HOSPITAL | Age: 59
End: 2024-04-16
Payer: MEDICAID

## 2024-04-16 PROCEDURE — 11042 DBRDMT SUBQ TIS 1ST 20SQCM/<: CPT

## 2024-04-20 NOTE — ANESTHESIA PROCEDURE NOTES
Airway  Date/Time: 3/12/2024 2:05 AM  Urgency: emergent    Airway not difficult    Staffing  Performed: resident   Authorized by: Prosper Joshi DO    Performed by: Ravi Hurd MD  Patient location during procedure: OR    Consent for Airway (if performed for an anesthetic, see related documentation for consents)  Patient identity confirmed: arm band  Consent: The procedure was performed in an emergent situation. Verbal consent obtained.  Consent given by: patient      Indications and Patient Condition  Indications for airway management: anesthesia  Spontaneous Ventilation: absent  Sedation level: deep  Preoxygenated: yes  Patient position: sniffing  Mask difficulty assessment: 0 - not attempted  Planned trial extubation    Final Airway Details  Final airway type: endotracheal airway      Successful airway: ETT  Cuffed: yes   Successful intubation technique: direct laryngoscopy  Endotracheal tube insertion site: oral  Blade: Alicia  Blade size: #3  ETT size (mm): 7.0  Cormack-Lehane Classification: grade I - full view of glottis  Placement verified by: capnometry   Number of attempts at approach: 1    Additional Comments  RSI with cricothyroid pressure. One attempt DL. ETCO2 confirmed.          20-Apr-2024 20:20

## 2024-04-23 ENCOUNTER — OFFICE VISIT (OUTPATIENT)
Dept: WOUND CARE | Facility: HOSPITAL | Age: 59
End: 2024-04-23
Payer: MEDICAID

## 2024-04-23 PROCEDURE — 11045 DBRDMT SUBQ TISS EACH ADDL: CPT

## 2024-04-23 PROCEDURE — 11042 DBRDMT SUBQ TIS 1ST 20SQCM/<: CPT

## 2024-04-30 ENCOUNTER — OFFICE VISIT (OUTPATIENT)
Dept: WOUND CARE | Facility: HOSPITAL | Age: 59
End: 2024-04-30
Payer: MEDICAID

## 2024-04-30 PROCEDURE — 11042 DBRDMT SUBQ TIS 1ST 20SQCM/<: CPT

## 2024-05-07 ENCOUNTER — APPOINTMENT (OUTPATIENT)
Dept: WOUND CARE | Facility: HOSPITAL | Age: 59
End: 2024-05-07
Payer: MEDICAID

## 2024-05-13 ENCOUNTER — APPOINTMENT (OUTPATIENT)
Dept: WOUND CARE | Facility: HOSPITAL | Age: 59
End: 2024-05-13
Payer: MEDICAID

## 2024-05-14 ENCOUNTER — OFFICE VISIT (OUTPATIENT)
Dept: WOUND CARE | Facility: HOSPITAL | Age: 59
End: 2024-05-14
Payer: MEDICAID

## 2024-05-14 PROCEDURE — 11042 DBRDMT SUBQ TIS 1ST 20SQCM/<: CPT

## 2024-05-21 ENCOUNTER — APPOINTMENT (OUTPATIENT)
Dept: WOUND CARE | Facility: HOSPITAL | Age: 59
End: 2024-05-21
Payer: MEDICAID

## 2024-06-25 ENCOUNTER — OFFICE VISIT (OUTPATIENT)
Dept: WOUND CARE | Facility: HOSPITAL | Age: 59
End: 2024-06-25
Payer: MEDICAID

## 2024-07-02 ENCOUNTER — APPOINTMENT (OUTPATIENT)
Dept: WOUND CARE | Facility: HOSPITAL | Age: 59
End: 2024-07-02
Payer: MEDICAID

## (undated) DEVICE — Device

## (undated) DEVICE — DRAPE, PAD, PREP, W/ 9 IN CUFF, 24 X 41, LF, NS

## (undated) DEVICE — DRAPE, SHEET, FAN FOLDED, HALF, 44 X 58 IN, DISPOSABLE, LF, STERILE

## (undated) DEVICE — SPONGE, LAP, XRAY DECT, 18IN X 18IN, W/MASTER DMT, STERILE

## (undated) DEVICE — MANIFOLD, 4 PORT NEPTUNE STANDARD

## (undated) DEVICE — GOWN, SURGICAL, SMARTGOWN, XLARGE, STERILE

## (undated) DEVICE — COVER, CART, 45 X 27 X 48 IN, CLEAR

## (undated) DEVICE — PREP TRAY, SKIN, DRY, W/GLOVES